# Patient Record
Sex: FEMALE | Race: WHITE | NOT HISPANIC OR LATINO | Employment: OTHER | URBAN - METROPOLITAN AREA
[De-identification: names, ages, dates, MRNs, and addresses within clinical notes are randomized per-mention and may not be internally consistent; named-entity substitution may affect disease eponyms.]

---

## 2017-03-08 ENCOUNTER — TELEPHONE (OUTPATIENT)
Dept: GASTROENTEROLOGY | Facility: CLINIC | Age: 47
End: 2017-03-08

## 2017-03-08 DIAGNOSIS — K86.3 CYST AND PSEUDOCYST OF PANCREAS: Primary | ICD-10-CM

## 2017-03-08 DIAGNOSIS — K86.2 CYST AND PSEUDOCYST OF PANCREAS: Primary | ICD-10-CM

## 2017-03-08 NOTE — TELEPHONE ENCOUNTER
Patient had EUS in 2015 with Dr Krueger for 4 pancreatic cysts.  I don't see where he made a recommended follow up.  Patient is calling because she wants a check up  Should she get another EUS or a CT/MRCP

## 2017-03-17 ENCOUNTER — TELEPHONE (OUTPATIENT)
Dept: ENDOSCOPY | Facility: HOSPITAL | Age: 47
End: 2017-03-17

## 2017-03-17 RX ORDER — ASPIRIN 81 MG/1
81 TABLET ORAL DAILY
COMMUNITY
End: 2022-02-10

## 2017-03-17 RX ORDER — INSULIN ASPART 100 [IU]/ML
30 INJECTION, SUSPENSION SUBCUTANEOUS 3 TIMES DAILY
COMMUNITY

## 2017-03-17 NOTE — TELEPHONE ENCOUNTER
Spoke with patient about Hx, allergies, meds and instructions for UEUS scheduled 3/27/17 at 1100.  Voices understanding.  Instructions mailed.

## 2017-03-27 ENCOUNTER — SURGERY (OUTPATIENT)
Age: 47
End: 2017-03-27

## 2017-03-27 ENCOUNTER — ANESTHESIA EVENT (OUTPATIENT)
Dept: ENDOSCOPY | Facility: HOSPITAL | Age: 47
End: 2017-03-27
Payer: MEDICAID

## 2017-03-27 ENCOUNTER — HOSPITAL ENCOUNTER (OUTPATIENT)
Facility: HOSPITAL | Age: 47
Discharge: HOME | End: 2017-03-27
Attending: INTERNAL MEDICINE | Admitting: INTERNAL MEDICINE
Payer: MEDICAID

## 2017-03-27 ENCOUNTER — ANESTHESIA (OUTPATIENT)
Dept: ENDOSCOPY | Facility: HOSPITAL | Age: 47
End: 2017-03-27
Payer: MEDICAID

## 2017-03-27 VITALS
TEMPERATURE: 98 F | HEIGHT: 60 IN | RESPIRATION RATE: 18 BRPM | DIASTOLIC BLOOD PRESSURE: 93 MMHG | HEART RATE: 89 BPM | BODY MASS INDEX: 41.23 KG/M2 | WEIGHT: 210 LBS | OXYGEN SATURATION: 99 % | SYSTOLIC BLOOD PRESSURE: 178 MMHG

## 2017-03-27 DIAGNOSIS — K86.2 PANCREAS CYST: Primary | ICD-10-CM

## 2017-03-27 LAB
POCT GLUCOSE: 121 MG/DL (ref 70–110)
POCT GLUCOSE: 146 MG/DL (ref 70–110)

## 2017-03-27 PROCEDURE — 25000003 PHARM REV CODE 250: Performed by: NURSE ANESTHETIST, CERTIFIED REGISTERED

## 2017-03-27 PROCEDURE — 81025 URINE PREGNANCY TEST: CPT | Performed by: INTERNAL MEDICINE

## 2017-03-27 PROCEDURE — 43259 EGD US EXAM DUODENUM/JEJUNUM: CPT | Mod: ,,, | Performed by: INTERNAL MEDICINE

## 2017-03-27 PROCEDURE — 63600175 PHARM REV CODE 636 W HCPCS: Performed by: NURSE ANESTHETIST, CERTIFIED REGISTERED

## 2017-03-27 PROCEDURE — 25000003 PHARM REV CODE 250: Performed by: INTERNAL MEDICINE

## 2017-03-27 PROCEDURE — 43259 EGD US EXAM DUODENUM/JEJUNUM: CPT | Performed by: INTERNAL MEDICINE

## 2017-03-27 PROCEDURE — 37000009 HC ANESTHESIA EA ADD 15 MINS: Performed by: INTERNAL MEDICINE

## 2017-03-27 PROCEDURE — D9220A PRA ANESTHESIA: Mod: CRNA,,, | Performed by: NURSE ANESTHETIST, CERTIFIED REGISTERED

## 2017-03-27 PROCEDURE — 37000008 HC ANESTHESIA 1ST 15 MINUTES: Performed by: INTERNAL MEDICINE

## 2017-03-27 PROCEDURE — D9220A PRA ANESTHESIA: Mod: ANES,,, | Performed by: ANESTHESIOLOGY

## 2017-03-27 RX ORDER — FUROSEMIDE 40 MG/1
40 TABLET ORAL 2 TIMES DAILY
COMMUNITY
End: 2022-01-11

## 2017-03-27 RX ORDER — MIDAZOLAM HYDROCHLORIDE 1 MG/ML
INJECTION, SOLUTION INTRAMUSCULAR; INTRAVENOUS
Status: DISCONTINUED | OUTPATIENT
Start: 2017-03-27 | End: 2017-03-27

## 2017-03-27 RX ORDER — PROPOFOL 10 MG/ML
VIAL (ML) INTRAVENOUS CONTINUOUS PRN
Status: DISCONTINUED | OUTPATIENT
Start: 2017-03-27 | End: 2017-03-27

## 2017-03-27 RX ORDER — SODIUM CHLORIDE 9 MG/ML
INJECTION, SOLUTION INTRAVENOUS CONTINUOUS PRN
Status: DISCONTINUED | OUTPATIENT
Start: 2017-03-27 | End: 2017-03-27

## 2017-03-27 RX ORDER — SODIUM CHLORIDE 9 MG/ML
INJECTION, SOLUTION INTRAVENOUS CONTINUOUS
Status: DISCONTINUED | OUTPATIENT
Start: 2017-03-27 | End: 2017-03-27 | Stop reason: HOSPADM

## 2017-03-27 RX ORDER — LABETALOL HYDROCHLORIDE 5 MG/ML
INJECTION, SOLUTION INTRAVENOUS
Status: DISCONTINUED | OUTPATIENT
Start: 2017-03-27 | End: 2017-03-27

## 2017-03-27 RX ORDER — METOPROLOL SUCCINATE 50 MG/1
50 TABLET, EXTENDED RELEASE ORAL DAILY
COMMUNITY
End: 2022-02-10

## 2017-03-27 RX ORDER — CLONIDINE HYDROCHLORIDE 0.1 MG/1
0.1 TABLET ORAL 2 TIMES DAILY
COMMUNITY
End: 2022-02-10

## 2017-03-27 RX ORDER — PROPOFOL 10 MG/ML
VIAL (ML) INTRAVENOUS
Status: DISCONTINUED | OUTPATIENT
Start: 2017-03-27 | End: 2017-03-27

## 2017-03-27 RX ORDER — GABAPENTIN 300 MG/1
300 CAPSULE ORAL 3 TIMES DAILY
COMMUNITY
End: 2022-02-10

## 2017-03-27 RX ORDER — LIDOCAINE HYDROCHLORIDE 10 MG/ML
INJECTION, SOLUTION INTRAVENOUS
Status: DISCONTINUED | OUTPATIENT
Start: 2017-03-27 | End: 2017-03-27

## 2017-03-27 RX ADMIN — SODIUM CHLORIDE: 0.9 INJECTION, SOLUTION INTRAVENOUS at 10:03

## 2017-03-27 RX ADMIN — PROPOFOL 50 MG: 10 INJECTION, EMULSION INTRAVENOUS at 10:03

## 2017-03-27 RX ADMIN — MIDAZOLAM HYDROCHLORIDE 2 MG: 1 INJECTION, SOLUTION INTRAMUSCULAR; INTRAVENOUS at 10:03

## 2017-03-27 RX ADMIN — LIDOCAINE HYDROCHLORIDE 100 MG: 10 INJECTION, SOLUTION INTRAVENOUS at 10:03

## 2017-03-27 RX ADMIN — PROPOFOL 150 MCG/KG/MIN: 10 INJECTION, EMULSION INTRAVENOUS at 10:03

## 2017-03-27 RX ADMIN — LABETALOL HYDROCHLORIDE 10 MG: 5 INJECTION, SOLUTION INTRAVENOUS at 10:03

## 2017-03-27 NOTE — IP AVS SNAPSHOT
[facility]  [address]  [facility] [address]  Phone: [phone]           Patient Discharge Instructions     Our goal is to set you up for success. This packet includes information on your condition, medications, and your home care. It will help you to care for yourself so you don't get sicker and need to go back to the hospital.     Please ask your nurse if you have any questions.        There are many details to remember when preparing to leave the hospital. Here is what you will need to do:    1. Take your medicine. If you are prescribed medications, review your Medication List in the following pages. You may have new medications to  at the pharmacy and others that you'll need to stop taking. Review the instructions for how and when to take your medications. Talk with your doctor or nurses if you are unsure of what to do.     2. Go to your follow-up appointments. Specific follow-up information is listed in the following pages. Your may be contacted by a transition nurse or clinical provider about future appointments. Be sure we have all of the phone numbers to reach you, if needed. Please contact your provider's office if you are unable to make an appointment.     3. Watch for warning signs. Your doctor or nurse will give you detailed warning signs to watch for and when to call for assistance. These instructions may also include educational information about your condition. If you experience any of warning signs to your health, call your doctor.               [facility]  Unless otherwise directed by your provider, please contact [facility], our nurse care line that is available for 24/7 assistance.     [phone] (toll-free)    Registered nurses in the [facility] provide clinical advisement, health education, appointment booking, and other advisory services.                    ** Verify the list of medication(s) below is accurate and up  to date. Carry this with you in case of emergency. If your medications have changed, please notify your healthcare provider.             Medication List      ASK your doctor about these medications        Additional Info                      acyclovir 800 MG Tab   Commonly known as:  ZOVIRAX   Refills:  0   Dose:  800 mg    Instructions:  Take 800 mg by mouth every 4 (four) hours.     Begin Date    AM    Noon    PM    Bedtime       aspirin 81 MG EC tablet   Commonly known as:  ECOTRIN   Refills:  0   Dose:  81 mg    Instructions:  Take 81 mg by mouth once daily.     Begin Date    AM    Noon    PM    Bedtime       BYDUREON 2 mg Ssrr   Refills:  0   Dose:  2 mg   Generic drug:  exenatide microspheres    Instructions:  Inject 2 mg into the skin once a week. On Wednesday.     Begin Date    AM    Noon    PM    Bedtime       cloNIDine 0.1 MG tablet   Commonly known as:  CATAPRES   Refills:  0   Dose:  0.1 mg    Instructions:  Take 0.1 mg by mouth 2 (two) times daily.     Begin Date    AM    Noon    PM    Bedtime       furosemide 40 MG tablet   Commonly known as:  LASIX   Refills:  0   Dose:  40 mg    Instructions:  Take 40 mg by mouth 2 (two) times daily.     Begin Date    AM    Noon    PM    Bedtime       gabapentin 300 MG capsule   Commonly known as:  NEURONTIN   Refills:  0   Dose:  300 mg    Instructions:  Take 300 mg by mouth 3 (three) times daily.     Begin Date    AM    Noon    PM    Bedtime       hydrocodone-acetaminophen 7.5-325mg 7.5-325 mg per tablet   Commonly known as:  NORCO   Refills:  0   Dose:  1 tablet    Instructions:  Take 1 tablet by mouth every 4 (four) hours as needed for Pain.     Begin Date    AM    Noon    PM    Bedtime       insulin aspart 100 unit/mL injection   Commonly known as:  NOVOLOG   Refills:  0    Instructions:  Inject into the skin 3 (three) times daily before meals.     Begin Date    AM    Noon    PM    Bedtime       insulin aspart protamine-insulin aspart 100 unit/mL (70-30) Inpn  pen   Commonly known as:  NovoLOG 70/30   Refills:  0   Dose:  30 Units    Instructions:  Inject 30 Units into the skin 3 (three) times daily.     Begin Date    AM    Noon    PM    Bedtime       lactulose 10 gram/15 mL solution   Commonly known as:  CHRONULAC   Refills:  0   Dose:  10 g    Instructions:  Take 10 g by mouth Daily.     Begin Date    AM    Noon    PM    Bedtime       methocarbamol 750 MG Tab   Commonly known as:  ROBAXIN   Refills:  0   Dose:  1500 mg    Instructions:  Take 1,500 mg by mouth 3 (three) times daily.     Begin Date    AM    Noon    PM    Bedtime       metoprolol succinate 50 MG 24 hr tablet   Commonly known as:  TOPROL-XL   Refills:  0   Dose:  50 mg    Instructions:  Take 50 mg by mouth once daily.     Begin Date    AM    Noon    PM    Bedtime       predniSONE 10 MG tablet   Commonly known as:  DELTASONE   Refills:  0   Dose:  10 mg    Instructions:  Take 10 mg by mouth once daily.     Begin Date    AM    Noon    PM    Bedtime       tramadol 50 mg tablet   Commonly known as:  ULTRAM   Refills:  0   Dose:  50 mg    Instructions:  Take 50 mg by mouth every 12 (twelve) hours as needed for Pain.     Begin Date    AM    Noon    PM    Bedtime       trazodone 50 MG tablet   Commonly known as:  DESYREL   Refills:  0   Dose:  50 mg    Instructions:  Take 50 mg by mouth every evening.     Begin Date    AM    Noon    PM    Bedtime                  Please bring to all follow up appointments:    1. A copy of your discharge instructions.  2. All medicines you are currently taking in their original bottles.  3. Identification and insurance card.    Please arrive 15 minutes ahead of scheduled appointment time.    Please call 24 hours in advance if you must reschedule your appointment and/or time.          Discharge Instructions     Future Orders    Diet general     Questions:    Total calories:      Fat restriction, if any:      Protein restriction, if any:      Na restriction, if any:      Fluid  restriction:      Additional restrictions:          Discharge Instructions         Endoscopic Ultrasound (EUS)    An endoscopic ultrasound (EUS) is a test to look at the inside of your gastrointestinal (GI) tract. It's commonly used to look for cancers or growths in the esophagus, stomach, pancreas, liver, and rectum. It can help to stage cancer (see how advanced a cancer is). EUS may also be used to help diagnose certain diseases or to drain cysts or abscesses.  What is EUS?  EUS shows both ultrasound images and live video of the GI tract. During the test, a flexible tube called an endoscope (scope) is used. At the end of the scope is a tiny video camera and light. The video camera sends live images to a monitor. The scope also contains a very small ultrasound device. This uses sound waves to create images and send them to a monitor.  A needle is passed through the scope. The needle can be used take a small sample of tissue for testing. This is called a biopsy. The needle can be used to take a sample of fluid. This is called fine-needle aspiration (FNA).  Risks and possible complications of EUS  Risks and possible complications include the following:  · Bleeding  · Infection  · A perforation (hole) in the digestive tract   · Risks of sedation or anesthesia   Before the test  Be prepared prior to the test:  · Tell your healthcare provider what medicine you take. This includes vitamins, herbs, and over-the-counter medicine. It also includes any blood thinners, such as warfarin, clopidogrel, ibuprofen, or daily aspirin. Ask your healthcare provider if you need to stop taking some or all of them before the test.  · You may be prescribed antibiotics to take before or after the test. This depends on the area being studied and what is done during the test. These medicines help prevent infection.  · Carefully follow the instructions for preparing for the test to make sure results are accurate. Instructions may  include:  ¨ If youre having an EUS of the upper GI tract (esophagus, stomach, duodenum, pancreas, liver):  § Do not eat or drink for 6 hours before the test.  ¨ If youre having an EUS of the lower GI tract (rectum):  § Before the test, do bowel prep as instructed to clean your rectum of stool. This may involve a clear liquid diet and using a laxative (liquid or pills) the night before the test. Or it may mean doing one or more enemas the morning of the test.  § Do not eat or drink for 6 hours before the test.  · Be sure to arrive on time at the facility. Bring your identification and health insurance card. Leave valuables at home. If you have them, bring X-rays or other test results with you.  Let the healthcare provider know  For your safety, tell the healthcare provider if you:  · Take insulin. Your dose may need to be changed on the day of your test.  · Are allergic to latex.  · Have any other allergies.  · Are taking blood thinners.   During the test  An endoscopic ultrasound usually takes place in a hospital. The procedure itself may take 1 to 2 hours. You will likely go home soon afterward. During the test:  · You lie on your left side on an exam table.  · An intravenous (IV) line will be put into a vein in your arm or hand. This line supplies fluids and medicines. To keep you comfortable during the test, you will be given a sedative medicine. This medicine prevents discomfort and will make you sleepy.  · If you are having an EUS of the upper GI tract, local anesthetic may be sprayed in your throat. This will help you be more comfortable as the healthcare provider inserts the scope. The healthcare provider then gently puts the flexible scope into your mouth or nose and down your throat.  · If youre having an EUS of the lower GI tract, the healthcare provider gently puts the flexible scope into your anus.  · During the test, the scope sends live video and ultrasound images from inside your body to nearby  monitors. These are used to examine your GI tract. Specialized procedures, such as drainage, are done as needed.  · The healthcare provider may discuss the results with you soon after the test. Biopsy results take several  days.  · In most cases, you can go home within a few hours of the test. When you leave the facility, have an adult family member or friend drive you, even if you don't feel that sleepy.  After the test  Here is what to expect after the test:  · You may feel tired from the sedative. This should wear off by the end of the day.  · If you had an upper digestive endoscopy, your throat may feel sore for a day or two. Over-the-counter sore throat lozenges and spray should help.  · You can eat and drink normally as soon as the test is done.  When to call the healthcare provider  Call your healthcare provider if you notice any of the following:  · Fever of 100.4°F (38.0°C) or higher, or as advised by your healthcare provider  · Shortness of breath  · Vomiting blood, blood in stool, or black stools  · Coughing or hoarse voice that wont go away   Date Last Reviewed: 7/1/2016  © 8060-4809 [employer]. [address] [place] [address]. All rights reserved. This information is not intended as a substitute for professional medical care. Always follow your healthcare professional's instructions.            Admission Information     Date & Time Provider Department CSN    3/27/2017  9:38 AM Avinash Valencia MD [facility] [id number]      Care Providers     Provider Role Specialty Primary office phone    Avinash Valencia MD Attending Provider Gastroenterology [phone]    Avinash Valencia MD Surgeon  Gastroenterology [phone]      Your Vitals Were     BP Pulse Temp Resp Height Weight    175/96 (BP Location: Left arm, Patient Position: Lying, BP Method: Automatic) 95 98.5 °F (36.9 °C) (Temporal) 18 5' (1.524 m) 95.3 kg (210 lb)    Last Period SpO2 BMI           02/22/2017 (Approximate) 99% 41.01 kg/m2        Recent Lab Values     No lab values to display.      Allergies as of 3/27/2017     No Known Allergies      Advance Directives     An advance directive is a document which, in the event you are no longer able to make decisions for yourself, tells your healthcare team what kind of treatment you do or do not want to receive, or who you would like to make those decisions for you.  If you do not currently have an advance directive, [facility] encourages you to create one.  For more information call:  [phone]), [phone] ([phone]),  or log on to [email or web address].        Language Assistance Services     ATTENTION: Language assistance services are available, free of charge. Please call [phone].      ATENCIÓN: Si habla español, tiene a fernandez disposición servicios gratuitos de asistencia lingüística. Llame al [phone].     CHÚ Ý: N?u b?n nói Ti?ng Vi?t, có các d?ch v? h? tr? ngôn ng? mi?n phí dành cho b?n. G?i s? [phone].        [facility] Sign-Up     Activating your [facility] account is as easy as 1-2-3!     1) Visit [email or web address], select Sign Up Now, enter this activation code and your date of birth, then select Next.  [email or web address]  Expires: 5/11/2017 11:50 AM      2) Create a username and password to use when you visit [facility] in the future and select a security question in case you lose your password and select Next.    3) Enter your e-mail address and click Sign Up!    Additional Information  If you have questions, please e-mail [email or web address] or call [phone] to talk to our [facility] staff. Remember, [facility] is NOT to be used for urgent needs. For medical emergencies, dial 911.          [facility] complies with applicable Federal civil rights laws and does not discriminate on the basis of race, color, national origin, age, disability, or sex.

## 2017-03-27 NOTE — ANESTHESIA POSTPROCEDURE EVALUATION
Anesthesia Post Evaluation    Patient: Mary Carmichael    Procedure(s) Performed: Procedure(s) (LRB):  ULTRASOUND-ENDOSCOPIC-UPPER (N/A)    Final Anesthesia Type: general  Patient location during evaluation: PACU  Patient participation: Yes- Able to Participate  Level of consciousness: awake and alert and oriented  Post-procedure vital signs: reviewed and stable  Pain management: adequate  Airway patency: patent  PONV status at discharge: No PONV  Anesthetic complications: no      Cardiovascular status: blood pressure returned to baseline and hemodynamically stable  Respiratory status: unassisted and spontaneous ventilation  Hydration status: euvolemic  Follow-up not needed.        Visit Vitals    BP (!) 175/96 (BP Location: Left arm, Patient Position: Lying, BP Method: Automatic)    Pulse 95    Temp 36.9 °C (98.5 °F) (Temporal)    Resp 18    Ht 5' (1.524 m)    Wt 95.3 kg (210 lb)    LMP 02/22/2017 (Approximate)    SpO2 99%    Breastfeeding No    BMI 41.01 kg/m2       Pain/Tao Score: Pain Assessment Performed: Yes (3/27/2017 10:07 AM)  Presence of Pain: denies (3/27/2017 11:38 AM)  Tao Score: 10 (3/27/2017 11:38 AM)

## 2017-03-27 NOTE — ANESTHESIA RELEASE NOTE
Anesthesia Release from PACU Note    Patient: Mary Carmichael    Procedure(s) Performed: Procedure(s) (LRB):  ULTRASOUND-ENDOSCOPIC-UPPER (N/A)    Anesthesia type: general    Post pain: Adequate analgesia    Post assessment: no apparent anesthetic complications and tolerated procedure well    Last Vitals:   Visit Vitals    BP (!) 175/96 (BP Location: Left arm, Patient Position: Lying, BP Method: Automatic)    Pulse 95    Temp 36.9 °C (98.5 °F) (Temporal)    Resp 18    Ht 5' (1.524 m)    Wt 95.3 kg (210 lb)    LMP 02/22/2017 (Approximate)    SpO2 99%    Breastfeeding No    BMI 41.01 kg/m2       Post vital signs: stable    Level of consciousness: awake and alert     Nausea/Vomiting: no nausea/no vomiting    Complications: none    Airway Patency: patent    Respiratory: unassisted, spontaneous ventilation    Cardiovascular: stable and blood pressure at baseline    Hydration: euvolemic

## 2017-03-27 NOTE — DISCHARGE INSTRUCTIONS
Endoscopic Ultrasound (EUS)    An endoscopic ultrasound (EUS) is a test to look at the inside of your gastrointestinal (GI) tract. It's commonly used to look for cancers or growths in the esophagus, stomach, pancreas, liver, and rectum. It can help to stage cancer (see how advanced a cancer is). EUS may also be used to help diagnose certain diseases or to drain cysts or abscesses.  What is EUS?  EUS shows both ultrasound images and live video of the GI tract. During the test, a flexible tube called an endoscope (scope) is used. At the end of the scope is a tiny video camera and light. The video camera sends live images to a monitor. The scope also contains a very small ultrasound device. This uses sound waves to create images and send them to a monitor.  A needle is passed through the scope. The needle can be used take a small sample of tissue for testing. This is called a biopsy. The needle can be used to take a sample of fluid. This is called fine-needle aspiration (FNA).  Risks and possible complications of EUS  Risks and possible complications include the following:  · Bleeding  · Infection  · A perforation (hole) in the digestive tract   · Risks of sedation or anesthesia   Before the test  Be prepared prior to the test:  · Tell your healthcare provider what medicine you take. This includes vitamins, herbs, and over-the-counter medicine. It also includes any blood thinners, such as warfarin, clopidogrel, ibuprofen, or daily aspirin. Ask your healthcare provider if you need to stop taking some or all of them before the test.  · You may be prescribed antibiotics to take before or after the test. This depends on the area being studied and what is done during the test. These medicines help prevent infection.  · Carefully follow the instructions for preparing for the test to make sure results are accurate. Instructions may include:  ¨ If youre having an EUS of the upper GI tract (esophagus, stomach, duodenum,  pancreas, liver):  § Do not eat or drink for 6 hours before the test.  ¨ If youre having an EUS of the lower GI tract (rectum):  § Before the test, do bowel prep as instructed to clean your rectum of stool. This may involve a clear liquid diet and using a laxative (liquid or pills) the night before the test. Or it may mean doing one or more enemas the morning of the test.  § Do not eat or drink for 6 hours before the test.  · Be sure to arrive on time at the facility. Bring your identification and health insurance card. Leave valuables at home. If you have them, bring X-rays or other test results with you.  Let the healthcare provider know  For your safety, tell the healthcare provider if you:  · Take insulin. Your dose may need to be changed on the day of your test.  · Are allergic to latex.  · Have any other allergies.  · Are taking blood thinners.   During the test  An endoscopic ultrasound usually takes place in a hospital. The procedure itself may take 1 to 2 hours. You will likely go home soon afterward. During the test:  · You lie on your left side on an exam table.  · An intravenous (IV) line will be put into a vein in your arm or hand. This line supplies fluids and medicines. To keep you comfortable during the test, you will be given a sedative medicine. This medicine prevents discomfort and will make you sleepy.  · If you are having an EUS of the upper GI tract, local anesthetic may be sprayed in your throat. This will help you be more comfortable as the healthcare provider inserts the scope. The healthcare provider then gently puts the flexible scope into your mouth or nose and down your throat.  · If youre having an EUS of the lower GI tract, the healthcare provider gently puts the flexible scope into your anus.  · During the test, the scope sends live video and ultrasound images from inside your body to nearby monitors. These are used to examine your GI tract. Specialized procedures, such as drainage,  are done as needed.  · The healthcare provider may discuss the results with you soon after the test. Biopsy results take several  days.  · In most cases, you can go home within a few hours of the test. When you leave the facility, have an adult family member or friend drive you, even if you don't feel that sleepy.  After the test  Here is what to expect after the test:  · You may feel tired from the sedative. This should wear off by the end of the day.  · If you had an upper digestive endoscopy, your throat may feel sore for a day or two. Over-the-counter sore throat lozenges and spray should help.  · You can eat and drink normally as soon as the test is done.  When to call the healthcare provider  Call your healthcare provider if you notice any of the following:  · Fever of 100.4°F (38.0°C) or higher, or as advised by your healthcare provider  · Shortness of breath  · Vomiting blood, blood in stool, or black stools  · Coughing or hoarse voice that wont go away   Date Last Reviewed: 7/1/2016  © 2344-2492 [employer]. [address] [place] [address]. All rights reserved. This information is not intended as a substitute for professional medical care. Always follow your healthcare professional's instructions.

## 2017-03-27 NOTE — H&P
History & Physical - Short Stay  Gastroenterology      SUBJECTIVE:     Procedure: EUS    Chief Complaint/Indication for Procedure: pancreas cyst    History of Present Illness:  Patient is a 46 y.o. female with pancreas cysts seen on EUS in 2015. Coming for surveillance.     PTA Medications   Medication Sig    aspirin (ECOTRIN) 81 MG EC tablet Take 81 mg by mouth once daily.    cloNIDine (CATAPRES) 0.1 MG tablet Take 0.1 mg by mouth 2 (two) times daily.    exenatide microspheres (BYDUREON) 2 mg SSRR Inject 2 mg into the skin once a week. On Wednesday.    furosemide (LASIX) 40 MG tablet Take 40 mg by mouth 2 (two) times daily.    gabapentin (NEURONTIN) 300 MG capsule Take 300 mg by mouth 3 (three) times daily.    insulin aspart (NOVOLOG) 100 unit/mL injection Inject into the skin 3 (three) times daily before meals.    insulin aspart protamine-insulin aspart (NOVOLOG 70/30) 100 unit/mL (70-30) InPn pen Inject 30 Units into the skin 3 (three) times daily.    methocarbamol (ROBAXIN) 750 MG Tab Take 1,500 mg by mouth 3 (three) times daily.    metoprolol succinate (TOPROL-XL) 50 MG 24 hr tablet Take 50 mg by mouth once daily.    predniSONE (DELTASONE) 10 MG tablet Take 10 mg by mouth once daily.    tramadol (ULTRAM) 50 mg tablet Take 50 mg by mouth every 12 (twelve) hours as needed for Pain.    trazodone (DESYREL) 50 MG tablet Take 50 mg by mouth every evening.    acyclovir (ZOVIRAX) 800 MG Tab Take 800 mg by mouth every 4 (four) hours.    hydrocodone-acetaminophen 7.5-325mg (NORCO) 7.5-325 mg per tablet Take 1 tablet by mouth every 4 (four) hours as needed for Pain.    lactulose (CHRONULAC) 10 gram/15 mL solution Take 10 g by mouth Daily.       Review of patient's allergies indicates:  No Known Allergies     Past Medical History:   Diagnosis Date    Abdominal pain     Cataract of both eyes     Constipation     Coronary artery disease     Cyst and pseudocyst of pancreas     Diabetes     Dilated bile  duct     Hepatitis C     Neuropathy     Thyroid disease      Past Surgical History:   Procedure Laterality Date    CARPAL TUNNEL RELEASE       SECTION, CLASSIC      CHOLECYSTECTOMY  2015    laparoscopic    EYE SURGERY       Family History   Problem Relation Age of Onset    Colon cancer Other      uncle    Diabetes Father      Social History   Substance Use Topics    Smoking status: Never Smoker    Smokeless tobacco: None    Alcohol use None       Review of Systems:  Constitutional: no fever or chills  Gastrointestinal: no nausea or vomiting, no abdominal pain or change in bowel habits    OBJECTIVE:     Vital Signs (Most Recent)  Temp: 98.7 °F (37.1 °C) (17 1004)  Pulse: 89 (17 1004)  Resp: 18 (17 1004)  BP: (!) 191/87 (17 1004)  SpO2: 100 % (17 1004)    Physical Exam:  General: well developed, well nourished  Abdomen: soft, non-tender non-distented; bowel sounds normal; no masses,  no organomegaly        ASSESSMENT/PLAN:     Patient is a 46 y.o. female with pancreas cysts seen on EUS in . Coming for surveillance.     Plan: EUS    Anesthesia Plan: MAC    ASA Grade: ASA 2 - Patient with mild systemic disease with no functional limitations

## 2017-03-27 NOTE — PLAN OF CARE
Problem: Patient Care Overview  Goal: Plan of Care Review  Outcome: Outcome(s) achieved Date Met:  03/27/17  Patient tolerating oral liquids without difficulty. No apparent s&s of distress noted at this time, no complaints voiced at this time. Discharge instructions reviewed with patient/family/friend with good verbal feedback received. Patient ready for discharge

## 2017-03-27 NOTE — TRANSFER OF CARE
Anesthesia Transfer of Care Note    Patient: Mary Carmichael    Procedure(s) Performed: Procedure(s) (LRB):  ULTRASOUND-ENDOSCOPIC-UPPER (N/A)    Patient location: PACU    Anesthesia Type: general    Transport from OR: Transported from OR on room air with adequate spontaneous ventilation. Transported from OR on 6-10 L/min O2 by face mask with adequate spontaneous ventilation    Post pain: adequate analgesia    Post assessment: no apparent anesthetic complications and tolerated procedure well    Post vital signs: stable    Level of consciousness: awake and alert    Nausea/Vomiting: no nausea/vomiting    Complications: none          Last vitals:   Visit Vitals    BP (!) 143/70 (BP Location: Left arm, Patient Position: Lying, BP Method: Automatic)    Pulse 96    Temp 36.9 °C (98.5 °F) (Temporal)    Resp 18    Ht 5' (1.524 m)    Wt 95.3 kg (210 lb)    LMP 02/22/2017 (Approximate)    SpO2 99%    Breastfeeding No    BMI 41.01 kg/m2

## 2019-01-28 ENCOUNTER — TELEPHONE (OUTPATIENT)
Dept: ENDOSCOPY | Facility: HOSPITAL | Age: 49
End: 2019-01-28

## 2019-01-28 DIAGNOSIS — K86.2 PANCREATIC CYST: ICD-10-CM

## 2021-11-10 ENCOUNTER — TELEPHONE (OUTPATIENT)
Dept: OBSTETRICS AND GYNECOLOGY | Facility: CLINIC | Age: 51
End: 2021-11-10
Payer: MEDICARE

## 2021-12-07 ENCOUNTER — OFFICE VISIT (OUTPATIENT)
Dept: OBSTETRICS AND GYNECOLOGY | Facility: CLINIC | Age: 51
End: 2021-12-07
Payer: MEDICARE

## 2021-12-07 VITALS
DIASTOLIC BLOOD PRESSURE: 78 MMHG | BODY MASS INDEX: 36.32 KG/M2 | SYSTOLIC BLOOD PRESSURE: 140 MMHG | WEIGHT: 185 LBS | HEIGHT: 60 IN

## 2021-12-07 DIAGNOSIS — N95.0 POSTMENOPAUSAL BLEEDING: ICD-10-CM

## 2021-12-07 DIAGNOSIS — Z12.31 ENCOUNTER FOR SCREENING MAMMOGRAM FOR BREAST CANCER: ICD-10-CM

## 2021-12-07 DIAGNOSIS — Z01.419 ENCOUNTER FOR WELL WOMAN EXAM WITH ROUTINE GYNECOLOGICAL EXAM: Primary | ICD-10-CM

## 2021-12-07 PROBLEM — R00.1 BRADYCARDIA, UNSPECIFIED: Status: ACTIVE | Noted: 2019-10-15

## 2021-12-07 PROBLEM — E03.9 HYPOTHYROIDISM: Status: ACTIVE | Noted: 2021-12-07

## 2021-12-07 PROBLEM — E55.9 VITAMIN D DEFICIENCY: Status: ACTIVE | Noted: 2021-12-07

## 2021-12-07 PROBLEM — M54.16 RADICULOPATHY, LUMBAR REGION: Status: ACTIVE | Noted: 2019-10-15

## 2021-12-07 PROBLEM — E11.42 DIABETIC PERIPHERAL NEUROPATHY: Status: ACTIVE | Noted: 2021-12-07

## 2021-12-07 PROBLEM — K22.10 EROSIVE ESOPHAGITIS: Status: ACTIVE | Noted: 2021-12-07

## 2021-12-07 PROBLEM — Z98.84 STATUS POST BARIATRIC SURGERY: Status: ACTIVE | Noted: 2021-10-15

## 2021-12-07 PROBLEM — D63.1 ANEMIA IN CHRONIC KIDNEY DISEASE: Status: ACTIVE | Noted: 2019-06-16

## 2021-12-07 PROBLEM — I27.21 PULMONARY ARTERIAL HYPERTENSION: Status: ACTIVE | Noted: 2021-12-07

## 2021-12-07 PROBLEM — R79.9 ABNORMAL BLOOD CHEMISTRY: Status: ACTIVE | Noted: 2018-07-05

## 2021-12-07 PROBLEM — Z90.3 HISTORY OF SLEEVE GASTRECTOMY: Status: ACTIVE | Noted: 2021-12-07

## 2021-12-07 PROBLEM — E87.8 OTHER DISORDERS OF ELECTROLYTE AND FLUID BALANCE, NOT ELSEWHERE CLASSIFIED: Status: ACTIVE | Noted: 2020-03-13

## 2021-12-07 PROBLEM — I10 BENIGN ESSENTIAL HYPERTENSION: Status: ACTIVE | Noted: 2018-07-05

## 2021-12-07 PROBLEM — N18.6 END-STAGE RENAL DISEASE: Status: ACTIVE | Noted: 2021-12-07

## 2021-12-07 PROBLEM — E11.9 TYPE 2 DIABETES MELLITUS: Status: ACTIVE | Noted: 2019-06-16

## 2021-12-07 PROBLEM — B18.2 CHRONIC HEPATITIS C VIRUS INFECTION: Status: ACTIVE | Noted: 2021-12-07

## 2021-12-07 PROBLEM — D50.9 IRON DEFICIENCY ANEMIA, UNSPECIFIED: Status: ACTIVE | Noted: 2020-03-13

## 2021-12-07 PROBLEM — E87.5 HYPERKALEMIA: Status: ACTIVE | Noted: 2020-11-19

## 2021-12-07 PROBLEM — G47.33 OBSTRUCTIVE SLEEP APNEA SYNDROME: Status: ACTIVE | Noted: 2021-12-07

## 2021-12-07 PROBLEM — M89.9 DISORDER OF BONE, UNSPECIFIED: Status: ACTIVE | Noted: 2019-06-16

## 2021-12-07 PROBLEM — R06.02 SHORTNESS OF BREATH: Status: ACTIVE | Noted: 2021-12-07

## 2021-12-07 PROBLEM — N25.81 SECONDARY HYPERPARATHYROIDISM OF RENAL ORIGIN: Status: ACTIVE | Noted: 2019-06-16

## 2021-12-07 PROBLEM — R60.0 EDEMA OF BOTH LOWER EXTREMITIES: Status: ACTIVE | Noted: 2021-12-07

## 2021-12-07 PROBLEM — E78.2 MIXED HYPERLIPIDEMIA: Status: ACTIVE | Noted: 2018-07-05

## 2021-12-07 PROBLEM — R09.89 CAROTID BRUIT: Status: ACTIVE | Noted: 2021-12-07

## 2021-12-07 PROBLEM — E66.9 OBESITY: Status: ACTIVE | Noted: 2021-12-07

## 2021-12-07 PROBLEM — I27.20 SEVERE PULMONARY HYPERTENSION: Status: ACTIVE | Noted: 2021-12-07

## 2021-12-07 PROBLEM — G47.10 HYPERSOMNIA: Status: ACTIVE | Noted: 2021-12-07

## 2021-12-07 PROBLEM — R09.02 HYPOXEMIA: Status: ACTIVE | Noted: 2021-12-07

## 2021-12-07 PROBLEM — N18.9 ANEMIA IN CHRONIC KIDNEY DISEASE: Status: ACTIVE | Noted: 2019-06-16

## 2021-12-07 PROBLEM — D68.9 COAGULATION DEFECT, UNSPECIFIED: Status: ACTIVE | Noted: 2019-05-22

## 2021-12-07 PROBLEM — Z79.899 LONG TERM USE OF DRUG: Status: ACTIVE | Noted: 2018-07-05

## 2021-12-07 PROBLEM — K21.9 GASTROESOPHAGEAL REFLUX DISEASE: Status: ACTIVE | Noted: 2021-12-07

## 2021-12-07 PROBLEM — N18.6 ESRD ON PERITONEAL DIALYSIS: Status: ACTIVE | Noted: 2018-08-03

## 2021-12-07 PROBLEM — E63.9 NUTRITION IMPAIRED DUE TO IMBALANCE OF NUTRIENTS: Status: ACTIVE | Noted: 2018-07-05

## 2021-12-07 PROBLEM — Z99.2 ESRD ON PERITONEAL DIALYSIS: Status: ACTIVE | Noted: 2018-08-03

## 2021-12-07 PROBLEM — D53.9 MACROCYTIC ANEMIA: Status: ACTIVE | Noted: 2021-12-07

## 2021-12-07 PROBLEM — Z99.2 DEPENDENCE ON RENAL DIALYSIS: Status: ACTIVE | Noted: 2020-09-29

## 2021-12-07 PROBLEM — M51.36 DEGENERATION OF INTERVERTEBRAL DISC OF LUMBAR REGION: Status: ACTIVE | Noted: 2021-12-07

## 2021-12-07 PROCEDURE — 88141 PR  CYTOPATH CERV/VAG INTERPRET: ICD-10-PCS | Mod: ,,, | Performed by: PATHOLOGY

## 2021-12-07 PROCEDURE — 88175 CYTOPATH C/V AUTO FLUID REDO: CPT | Performed by: PATHOLOGY

## 2021-12-07 PROCEDURE — 88141 CYTOPATH C/V INTERPRET: CPT | Mod: ,,, | Performed by: PATHOLOGY

## 2021-12-07 PROCEDURE — G0101 PR CA SCREEN;PELVIC/BREAST EXAM: ICD-10-PCS | Mod: S$GLB,,, | Performed by: NURSE PRACTITIONER

## 2021-12-07 PROCEDURE — G0101 CA SCREEN;PELVIC/BREAST EXAM: HCPCS | Mod: S$GLB,,, | Performed by: NURSE PRACTITIONER

## 2021-12-07 RX ORDER — METRONIDAZOLE 7.5 MG/G
1 GEL VAGINAL DAILY
Qty: 70 G | Refills: 5 | Status: SHIPPED | OUTPATIENT
Start: 2021-12-07 | End: 2021-12-12

## 2021-12-16 LAB
FINAL PATHOLOGIC DIAGNOSIS: ABNORMAL
Lab: ABNORMAL

## 2021-12-17 ENCOUNTER — TELEPHONE (OUTPATIENT)
Dept: OBSTETRICS AND GYNECOLOGY | Facility: CLINIC | Age: 51
End: 2021-12-17
Payer: MEDICARE

## 2021-12-20 ENCOUNTER — TELEPHONE (OUTPATIENT)
Dept: OBSTETRICS AND GYNECOLOGY | Facility: CLINIC | Age: 51
End: 2021-12-20
Payer: MEDICARE

## 2021-12-28 ENCOUNTER — PROCEDURE VISIT (OUTPATIENT)
Dept: OBSTETRICS AND GYNECOLOGY | Facility: CLINIC | Age: 51
End: 2021-12-28
Payer: MEDICARE

## 2021-12-28 VITALS
HEIGHT: 60 IN | DIASTOLIC BLOOD PRESSURE: 65 MMHG | SYSTOLIC BLOOD PRESSURE: 134 MMHG | WEIGHT: 183.63 LBS | BODY MASS INDEX: 36.05 KG/M2

## 2021-12-28 DIAGNOSIS — R87.613 HIGH GRADE SQUAMOUS INTRAEPITHELIAL LESION (HGSIL) ON CYTOLOGIC SMEAR OF CERVIX: Primary | ICD-10-CM

## 2021-12-28 PROCEDURE — 99203 OFFICE O/P NEW LOW 30 MIN: CPT | Mod: S$GLB,,, | Performed by: OBSTETRICS & GYNECOLOGY

## 2021-12-28 PROCEDURE — 99203 PR OFFICE/OUTPT VISIT, NEW, LEVL III, 30-44 MIN: ICD-10-PCS | Mod: S$GLB,,, | Performed by: OBSTETRICS & GYNECOLOGY

## 2022-01-11 ENCOUNTER — HOSPITAL ENCOUNTER (OUTPATIENT)
Dept: RADIOLOGY | Facility: CLINIC | Age: 52
Discharge: HOME | End: 2022-01-11
Attending: NURSE PRACTITIONER
Payer: MEDICARE

## 2022-01-11 ENCOUNTER — OFFICE VISIT (OUTPATIENT)
Dept: OBSTETRICS AND GYNECOLOGY | Facility: CLINIC | Age: 52
End: 2022-01-11
Payer: MEDICARE

## 2022-01-11 VITALS
SYSTOLIC BLOOD PRESSURE: 102 MMHG | WEIGHT: 185 LBS | DIASTOLIC BLOOD PRESSURE: 60 MMHG | HEIGHT: 60 IN | BODY MASS INDEX: 36.32 KG/M2

## 2022-01-11 DIAGNOSIS — Z12.31 ENCOUNTER FOR SCREENING MAMMOGRAM FOR BREAST CANCER: ICD-10-CM

## 2022-01-11 DIAGNOSIS — R87.613 HIGH GRADE SQUAMOUS INTRAEPITHELIAL LESION (HGSIL) ON CYTOLOGIC SMEAR OF CERVIX: Primary | ICD-10-CM

## 2022-01-11 PROCEDURE — 99214 OFFICE O/P EST MOD 30 MIN: CPT | Mod: S$GLB,,, | Performed by: OBSTETRICS & GYNECOLOGY

## 2022-01-11 PROCEDURE — 77067 MAMMO DIGITAL SCREENING BILAT WITH TOMO: ICD-10-PCS | Mod: S$GLB,,, | Performed by: RADIOLOGY

## 2022-01-11 PROCEDURE — 77067 SCR MAMMO BI INCL CAD: CPT | Mod: S$GLB,,, | Performed by: RADIOLOGY

## 2022-01-11 PROCEDURE — 77063 BREAST TOMOSYNTHESIS BI: CPT | Mod: S$GLB,,, | Performed by: RADIOLOGY

## 2022-01-11 PROCEDURE — 99214 PR OFFICE/OUTPT VISIT, EST, LEVL IV, 30-39 MIN: ICD-10-PCS | Mod: S$GLB,,, | Performed by: OBSTETRICS & GYNECOLOGY

## 2022-01-11 PROCEDURE — 77063 MAMMO DIGITAL SCREENING BILAT WITH TOMO: ICD-10-PCS | Mod: S$GLB,,, | Performed by: RADIOLOGY

## 2022-01-11 RX ORDER — FUROSEMIDE 80 MG/1
1 TABLET ORAL
COMMUNITY
Start: 2021-12-12

## 2022-01-11 RX ORDER — SILDENAFIL CITRATE 20 MG/1
20 TABLET ORAL DAILY
COMMUNITY
Start: 2022-01-09 | End: 2022-02-10 | Stop reason: DRUGHIGH

## 2022-01-11 RX ORDER — SPIRONOLACTONE 50 MG/1
100 TABLET, FILM COATED ORAL
COMMUNITY
Start: 2021-12-21 | End: 2022-02-10

## 2022-01-11 RX ORDER — MIDODRINE HYDROCHLORIDE 5 MG/1
1 TABLET ORAL
COMMUNITY
Start: 2021-04-26

## 2022-01-11 RX ORDER — LEVOTHYROXINE SODIUM 50 UG/1
50 TABLET ORAL DAILY
COMMUNITY
Start: 2022-01-08

## 2022-01-11 RX ORDER — HYDROXYZINE HYDROCHLORIDE 25 MG/1
1 TABLET, FILM COATED ORAL
COMMUNITY
Start: 2021-08-01 | End: 2022-02-10 | Stop reason: ALTCHOICE

## 2022-01-11 NOTE — PROGRESS NOTES
Mary Carmichael is a 51 y.o.  who presents today for pre-op visit the patient has a high-grade abnormal Pap smear with possible involvement of the endocervix and she has had a prior cryotherapy in the past for an abnormal Pap smear so she needs a LEEP cone biopsy with D and C at this time.  She understands risk, alternatives, and indications for that procedure.    C/C:   Chief Complaint   Patient presents with    Pre-op Exam     Pt here for pre-op. Leep BX with D&C scheduled 2022.       HPI: [place] [facility]  The procedure to be performed is LEEP BX with D&C.     MENSTRUAL HISTORY  Patient's last menstrual period was 2017 (approximate)..      PAP HISTORY: last pap , has hx of abnormal pap.        Review of patient's allergies indicates:  No Known Allergies  Past Medical History:   Diagnosis Date    Abdominal pain     Abnormal Pap smear of cervix 2016    colpo, cryo    Cataract of both eyes     Constipation     Coronary artery disease     Cyst and pseudocyst of pancreas     Diabetes     Dilated bile duct     Hepatitis C     Neuropathy     Thyroid disease      Past Surgical History:   Procedure Laterality Date    CARPAL TUNNEL RELEASE       SECTION, CLASSIC      CHOLECYSTECTOMY  2015    laparoscopic    Dylasis      EYE SURGERY      FINGER SURGERY      GASTRIC BYPASS  2021     Past Surgical History:   Procedure Laterality Date    CARPAL TUNNEL RELEASE       SECTION, CLASSIC      CHOLECYSTECTOMY  2015    laparoscopic    Dylasis      EYE SURGERY      FINGER SURGERY      GASTRIC BYPASS  2021     OB History    Para Term  AB Living   1 1 1 0 0 1   SAB IAB Ectopic Multiple Live Births   0 0 0 0 1      # Outcome Date GA Lbr Lenin/2nd Weight Sex Delivery Anes PTL Lv   1 Term 00    F  OTHER  SANTA     OB History        1    Para   1    Term   1       0    AB   0    Living    1       SAB   0    IAB   0    Ectopic   0    Multiple   0    Live Births   1               Family History   Problem Relation Age of Onset    Colon cancer Other         uncle    Diabetes Father     Asthma Mother     Diabetes Maternal Grandmother     Diabetes Maternal Grandfather     Diabetes Paternal Grandmother     Diabetes Paternal Grandfather      Social History     Substance and Sexual Activity   Sexual Activity Never    Partners: Male    Birth control/protection: None       Primary Doctor No          ROS  Review of Systems    OBJECTIVE:  /60   Ht 5' (1.524 m)   Wt 83.9 kg (185 lb)   LMP 2017 (Approximate)   BMI 36.13 kg/m²   Physical Exam   Constitutional/Gen: Constitutional/Gen: NAD, appears stated age  Breast: deferred  External genitalia: no lesions or discharge, normal hair distribution  Vagina: normal appearance, no lesions, no discharge, no evidence cystocele or rectocele.  Cervix: normal appearance, no discharge, no lesions, negative CMT  Uterus: nontender, mobile, normal size, contour, and position. No pathologic descent.  Adnexa: no masses or tenderness  Neurologic: A&O x 4  Psych: affect appropriate and without signs of mood, thought or memory difficulty appreciated    A:  High-grade cervix dysplasia on Pap smear and prior procedure to try to treat a lower grade abnormal Pap smear years ago.  51 y.o. female  for pre-op visit  Body mass index is 36.13 kg/m².  Patient Active Problem List   Diagnosis    Pancreas cyst    Abnormal blood chemistry    Anemia in chronic kidney disease    Benign essential hypertension    Bradycardia, unspecified    Carotid bruit    Chronic hepatitis C virus infection    Coagulation defect, unspecified    Degeneration of intervertebral disc of lumbar region    Dependence on renal dialysis    Type 2 diabetes mellitus    Diabetic peripheral neuropathy    Disorder of bone, unspecified    Edema of both lower extremities    End-stage  renal disease    Erosive esophagitis    ESRD on peritoneal dialysis    Gastroesophageal reflux disease    History of sleeve gastrectomy    Mixed hyperlipidemia    Hyperkalemia    Hypersomnia    Hypothyroidism    Hypoxemia    Iron deficiency anemia, unspecified    Long term use of drug    Radiculopathy, lumbar region    Macrocytic anemia    Other disorders of electrolyte and fluid balance, not elsewhere classified    Obesity    Nutrition impaired due to imbalance of nutrients    Obstructive sleep apnea syndrome    Pulmonary arterial hypertension    Secondary hyperparathyroidism of renal origin    Severe pulmonary hypertension    Shortness of breath    Status post bariatric surgery    Vitamin D deficiency    High grade squamous intraepithelial lesion (HGSIL) on cytologic smear of cervix         P:  Scheduled for LEEP cone biopsy and D&C.  There are no diagnoses linked to this encounter.      No follow-ups on file.

## 2022-01-21 ENCOUNTER — TELEPHONE (OUTPATIENT)
Dept: OBSTETRICS AND GYNECOLOGY | Facility: CLINIC | Age: 52
End: 2022-01-21
Payer: MEDICARE

## 2022-01-21 NOTE — TELEPHONE ENCOUNTER
SRG notified the office that the Pt did not show for her pre op visit. Spoke to Pt and she stated she was diagnosed with Covid on 1/18/22. Per Dr. Rendon Pt needs to wait two weeks before proceeding with surgery. Pt notified and verbalized understanding. She will call back when she wants to get it rescheduled.

## 2022-01-31 NOTE — TELEPHONE ENCOUNTER
Pt called stating she is ready to proceed with scheduling surgery. Offered 3/3/22 but Pt states she was told her surgery does not need to wait. Informed Pt I will notify the provider to see if he will make an exception for her surgery or if it can wait until March due to February surgery dates not being available.

## 2022-02-10 ENCOUNTER — OFFICE VISIT (OUTPATIENT)
Dept: OBSTETRICS AND GYNECOLOGY | Facility: CLINIC | Age: 52
End: 2022-02-10
Payer: MEDICARE

## 2022-02-10 VITALS
BODY MASS INDEX: 36.08 KG/M2 | SYSTOLIC BLOOD PRESSURE: 124 MMHG | WEIGHT: 183.81 LBS | HEIGHT: 60 IN | DIASTOLIC BLOOD PRESSURE: 76 MMHG

## 2022-02-10 DIAGNOSIS — R87.613 HIGH GRADE SQUAMOUS INTRAEPITHELIAL LESION (HGSIL) ON CYTOLOGIC SMEAR OF CERVIX: Primary | ICD-10-CM

## 2022-02-10 PROCEDURE — 99214 PR OFFICE/OUTPT VISIT, EST, LEVL IV, 30-39 MIN: ICD-10-PCS | Mod: S$GLB,,, | Performed by: OBSTETRICS & GYNECOLOGY

## 2022-02-10 PROCEDURE — 99214 OFFICE O/P EST MOD 30 MIN: CPT | Mod: S$GLB,,, | Performed by: OBSTETRICS & GYNECOLOGY

## 2022-02-10 RX ORDER — BUSPIRONE HYDROCHLORIDE 5 MG/1
5 TABLET ORAL 3 TIMES DAILY PRN
COMMUNITY
Start: 2022-02-02

## 2022-02-10 RX ORDER — FERRIC CITRATE 210 MG/1
420 TABLET, COATED ORAL 3 TIMES DAILY
COMMUNITY
End: 2022-10-03 | Stop reason: SDUPTHER

## 2022-02-10 RX ORDER — BACLOFEN 10 MG/1
10 TABLET ORAL DAILY
COMMUNITY
Start: 2022-01-31 | End: 2022-10-03

## 2022-02-10 RX ORDER — SUCROFERRIC OXYHYDROXIDE 500 MG/1
1 TABLET, CHEWABLE ORAL 3 TIMES DAILY
COMMUNITY
Start: 2022-02-08

## 2022-02-10 RX ORDER — PRAVASTATIN SODIUM 40 MG/1
40 TABLET ORAL DAILY
COMMUNITY
End: 2022-10-03

## 2022-02-10 RX ORDER — ASCORBIC ACID, TOCOPHERYL ACID SUCCINATE, THIAMINE, RIBOFLAVIN, NIACINAMIDE, PYRIDOXINE, FOLIC ACID, COBALAMIN, BIOTIN, PANTOTHENIC ACID, ZINC, SELENIUM 100; 1.5; 1.7; 20; 25; 3; 1; 300; 10; 15; 30; 7 MG/1; MG/1; MG/1; MG/1; MG/1; MG/1; MG/1; UG/1; MG/1; MG/1; [IU]/1; UG/1
TABLET, COATED ORAL
COMMUNITY

## 2022-02-10 RX ORDER — CLOPIDOGREL BISULFATE 75 MG/1
75 TABLET ORAL DAILY
COMMUNITY

## 2022-02-10 RX ORDER — SERTRALINE HYDROCHLORIDE 25 MG/1
25 TABLET, FILM COATED ORAL DAILY
COMMUNITY
End: 2022-10-03

## 2022-02-10 RX ORDER — DULAGLUTIDE 0.75 MG/.5ML
INJECTION, SOLUTION SUBCUTANEOUS
COMMUNITY
Start: 2022-02-01 | End: 2022-10-03 | Stop reason: SDUPTHER

## 2022-02-10 RX ORDER — FAMOTIDINE 40 MG/1
40 TABLET, FILM COATED ORAL NIGHTLY PRN
COMMUNITY

## 2022-02-10 NOTE — PROGRESS NOTES
Mary Carmichael is a 51 y.o.  who presents today for pre-op visit.  She has a high-grade abnormal Pap smear lesion and has had a LEEP cone biopsy in the past, so she needs repeat of a LEEP cone biopsy because of high risk involving the endocervix.  She understands risk, alternatives, and indications for this procedure.      C/C:   Chief Complaint   Patient presents with    Pre-op Exam     Pt here for Pre-op Leep Cone Bx with D&C.   Scheduled for [employer] [facility] on 2020 to    HPI: [place] [facility]  The procedure to be performed is Leep Cone Bx with D&C.     MENSTRUAL HISTORY  Patient's last menstrual period was 2017 (approximate)..      PAP HISTORY: last pap 2021, admits history of abnormal pap smears.      Review of patient's allergies indicates:  No Known Allergies  Past Medical History:   Diagnosis Date    Abdominal pain     Abnormal Pap smear of cervix 2016    colpo, cryo    Cataract of both eyes     Constipation     Coronary artery disease     Cyst and pseudocyst of pancreas     Diabetes     Dilated bile duct     Hepatitis C     Neuropathy     Thyroid disease      Past Surgical History:   Procedure Laterality Date    CARPAL TUNNEL RELEASE       SECTION, CLASSIC      CHOLECYSTECTOMY  2015    laparoscopic    Dylasis      EYE SURGERY      FINGER SURGERY      GASTRIC BYPASS  2021     Past Surgical History:   Procedure Laterality Date    CARPAL TUNNEL RELEASE       SECTION, CLASSIC      CHOLECYSTECTOMY  2015    laparoscopic    Dylasis      EYE SURGERY      FINGER SURGERY      GASTRIC BYPASS  2021     OB History    Para Term  AB Living   1 1 1 0 0 1   SAB IAB Ectopic Multiple Live Births   0 0 0 0 1      # Outcome Date GA Lbr Lenin/2nd Weight Sex Delivery Anes PTL Lv   1 Term 00    F  OTHER  SANTA     OB History        1    Para   1    Term   1       0    AB    0    Living   1       SAB   0    IAB   0    Ectopic   0    Multiple   0    Live Births   1               Family History   Problem Relation Age of Onset    Colon cancer Other         uncle    Diabetes Father     Asthma Mother     Diabetes Maternal Grandmother     Diabetes Maternal Grandfather     Diabetes Paternal Grandmother     Diabetes Paternal Grandfather      Social History     Substance and Sexual Activity   Sexual Activity Never    Partners: Male    Birth control/protection: None       Primary Doctor No          ROS  Review of Systems    OBJECTIVE:  Ht 5' (1.524 m)   Wt 83.4 kg (183 lb 12.8 oz)   LMP 2017 (Approximate)   BMI 35.90 kg/m²   Physical Exam   Constitutional/Gen: Constitutional/Gen: NAD, appears stated age  Breast: deferred  External genitalia: no lesions or discharge, normal hair distribution  Vagina: normal appearance, no lesions, no discharge, no evidence cystocele or rectocele.  Cervix: normal appearance, no discharge, no lesions, negative CMT  Uterus: nontender, mobile, normal size, contour, and position. No pathologic descent.  Adnexa: no masses or tenderness  Neurologic: A&O x 4  Psych: affect appropriate and without signs of mood, thought or memory difficulty appreciated    A:  High-grade abnormal Pap smear the cervix involving the endocervix.    51 y.o. female  for pre-op visit  Body mass index is 35.9 kg/m².  Patient Active Problem List   Diagnosis    Pancreas cyst    Abnormal blood chemistry    Anemia in chronic kidney disease    Benign essential hypertension    Bradycardia, unspecified    Carotid bruit    Chronic hepatitis C virus infection    Coagulation defect, unspecified    Degeneration of intervertebral disc of lumbar region    Dependence on renal dialysis    Type 2 diabetes mellitus    Diabetic peripheral neuropathy    Disorder of bone, unspecified    Edema of both lower extremities    End-stage renal disease    Erosive esophagitis     ESRD on peritoneal dialysis    Gastroesophageal reflux disease    History of sleeve gastrectomy    Mixed hyperlipidemia    Hyperkalemia    Hypersomnia    Hypothyroidism    Hypoxemia    Iron deficiency anemia, unspecified    Long term use of drug    Radiculopathy, lumbar region    Macrocytic anemia    Other disorders of electrolyte and fluid balance, not elsewhere classified    Obesity    Nutrition impaired due to imbalance of nutrients    Obstructive sleep apnea syndrome    Pulmonary arterial hypertension    Secondary hyperparathyroidism of renal origin    Severe pulmonary hypertension    Shortness of breath    Status post bariatric surgery    Vitamin D deficiency    High grade squamous intraepithelial lesion (HGSIL) on cytologic smear of cervix         P:  LEEP cone biopsy of the cervix and D&C  There are no diagnoses linked to this encounter.      No follow-ups on file.

## 2022-02-18 ENCOUNTER — OUTSIDE PLACE OF SERVICE (OUTPATIENT)
Dept: OBSTETRICS AND GYNECOLOGY | Facility: CLINIC | Age: 52
End: 2022-02-18
Payer: MEDICARE

## 2022-02-18 PROCEDURE — 57522 PR CONIZATION CERVIX,LOOP ELECTRD: ICD-10-PCS | Mod: ,,, | Performed by: OBSTETRICS & GYNECOLOGY

## 2022-02-18 PROCEDURE — 57522 CONIZATION OF CERVIX: CPT | Mod: ,,, | Performed by: OBSTETRICS & GYNECOLOGY

## 2022-03-04 ENCOUNTER — TELEPHONE (OUTPATIENT)
Dept: OBSTETRICS AND GYNECOLOGY | Facility: CLINIC | Age: 52
End: 2022-03-04
Payer: MEDICARE

## 2022-03-09 ENCOUNTER — DOCUMENTATION ONLY (OUTPATIENT)
Dept: OBSTETRICS AND GYNECOLOGY | Facility: CLINIC | Age: 52
End: 2022-03-09
Payer: MEDICARE

## 2022-03-09 ENCOUNTER — OFFICE VISIT (OUTPATIENT)
Dept: OBSTETRICS AND GYNECOLOGY | Facility: CLINIC | Age: 52
End: 2022-03-09
Payer: MEDICARE

## 2022-03-09 VITALS
DIASTOLIC BLOOD PRESSURE: 76 MMHG | BODY MASS INDEX: 35.66 KG/M2 | HEIGHT: 60 IN | WEIGHT: 181.63 LBS | SYSTOLIC BLOOD PRESSURE: 182 MMHG

## 2022-03-09 DIAGNOSIS — Z09 POSTOP CHECK: Primary | ICD-10-CM

## 2022-03-09 PROCEDURE — 99024 PR POST-OP FOLLOW-UP VISIT: ICD-10-PCS | Mod: S$GLB,POP,, | Performed by: OBSTETRICS & GYNECOLOGY

## 2022-03-09 PROCEDURE — 99024 POSTOP FOLLOW-UP VISIT: CPT | Mod: S$GLB,POP,, | Performed by: OBSTETRICS & GYNECOLOGY

## 2022-03-09 NOTE — PROGRESS NOTES
History of Present Illness:   Pateint presents today  2 weeks postop, status post D and C with cone biopsy for abnormal Pap smear, with complaint of no complaints.  The patient was here for postop checkup and had to leave before I was able to see year so I called her by phone and discussed her pathology and plan to follow with Pap smears in 6 months and every year there after.    Pathology:  Pathology showed no abnormalities of the ectocervix, endocervix biopsy, or endocervical curettage, or endometrial curettage.    Past medical and surgical history reviewed.   I have reviewed the patient's medical history in detail and updated the computerized patient record.    Physical exam:  Vital Signs: as above, reviewed.  Constitutional: She is oriented to person, place, and time. She appears well-developed and well-nourished. No distress.   HENT:   Head: Normocephalic and atraumatic.   Eyes: Conjunctivae and EOM are normal. No scleral icterus.   Neck: Normal range of motion. Neck supple. No tracheal deviation present.   Cardiovascular: Normal rate.    Pulmonary/Chest: Effort normal. No respiratory distress. She exhibits no tenderness.  Breasts: deferred  Abdominal: Soft. She exhibits no distension and no mass.  The incisions are healing well. There is no rebound and no guarding.   Genitourinary: Deferred  Musculoskeletal: Normal range of motion.   Lymphadenopathy: No inguinal adenopathy present.   Neurological: She is alert and oriented to person, place, and time. Coordination normal.   Skin: Skin is warm and dry. She is not diaphoretic.   Psychiatric: She has a normal mood and affect.    Assessment:  Normal postoperative recovery in progress after cone biopsy of the cervix with D&C    Plan:  Follow up  6 months for Pap smear

## 2022-03-09 NOTE — PROGRESS NOTES
History of Present Illness:   Pateint presents today  2 weeks postop, status post LEEP cone biopsy with D&C, with complaint of no problems.    Pathology:  Benign    Past medical and surgical history reviewed.   I have reviewed the patient's medical history in detail and updated the computerized patient record.    Physical exam:  Vital Signs: as above, reviewed.  Constitutional: She is oriented to person, place, and time. She appears well-developed and well-nourished. No distress.   HENT:   Head: Normocephalic and atraumatic.   Eyes: Conjunctivae and EOM are normal. No scleral icterus.   Neck: Normal range of motion. Neck supple. No tracheal deviation present.   Cardiovascular: Normal rate.    Pulmonary/Chest: Effort normal. No respiratory distress. She exhibits no tenderness.  Breasts: deferred  Abdominal: Soft. She exhibits no distension and no mass.  The incisions are healing well. There is no rebound and no guarding.   Genitourinary: Deferred  Musculoskeletal: Normal range of motion.   Lymphadenopathy: No inguinal adenopathy present.   Neurological: She is alert and oriented to person, place, and time. Coordination normal.   Skin: Skin is warm and dry. She is not diaphoretic.   Psychiatric: She has a normal mood and affect.    Assessment:  Normal postoperative recovery after LEEP cone biopsy with D&C    Plan:  Repeat Pap smear in 6 months  Follow up  6 months

## 2022-04-20 ENCOUNTER — TELEPHONE (OUTPATIENT)
Dept: OBSTETRICS AND GYNECOLOGY | Facility: CLINIC | Age: 52
End: 2022-04-20
Payer: MEDICARE

## 2022-04-20 NOTE — TELEPHONE ENCOUNTER
----- Message from Zee German sent at 4/20/2022 11:34 AM CDT -----  Contact: pt  Type: Needs Medical Advice    Who Called: pt  Best Call Back Number: [phone]  Inquiry/Question: pt wants to sched 6 month follow up from procedure I advise schedule was not up yet that far in advance on my eng but would like to be scheduled, please advise pt  Thank you~

## 2022-04-20 NOTE — TELEPHONE ENCOUNTER
Pt wanted to schedule her 6 mos follow up visit which is in August and the schedule would not allow me to schedule at this time pt stated that she will call back in June2022.

## 2022-10-03 ENCOUNTER — OFFICE VISIT (OUTPATIENT)
Dept: OBSTETRICS AND GYNECOLOGY | Facility: CLINIC | Age: 52
End: 2022-10-03
Payer: MEDICARE

## 2022-10-03 VITALS
BODY MASS INDEX: 30.75 KG/M2 | WEIGHT: 156.63 LBS | HEIGHT: 60 IN | DIASTOLIC BLOOD PRESSURE: 70 MMHG | SYSTOLIC BLOOD PRESSURE: 120 MMHG

## 2022-10-03 DIAGNOSIS — N34.2 INFECTIVE URETHRITIS: ICD-10-CM

## 2022-10-03 DIAGNOSIS — R30.0 DYSURIA: ICD-10-CM

## 2022-10-03 DIAGNOSIS — R87.613 HIGH GRADE SQUAMOUS INTRAEPITHELIAL LESION (HGSIL) ON CYTOLOGIC SMEAR OF CERVIX: ICD-10-CM

## 2022-10-03 DIAGNOSIS — Z12.31 BREAST CANCER SCREENING BY MAMMOGRAM: ICD-10-CM

## 2022-10-03 DIAGNOSIS — Z01.419 ENCOUNTER FOR WELL WOMAN EXAM WITH ROUTINE GYNECOLOGICAL EXAM: Primary | ICD-10-CM

## 2022-10-03 PROBLEM — B18.2 CHRONIC HEPATITIS C VIRUS INFECTION: Status: RESOLVED | Noted: 2021-12-07 | Resolved: 2022-10-03

## 2022-10-03 PROBLEM — Q43.8 CONGENITAL REDUNDANT COLON: Status: ACTIVE | Noted: 2022-10-03

## 2022-10-03 PROCEDURE — 3008F BODY MASS INDEX DOCD: CPT | Mod: CPTII,S$GLB,, | Performed by: OBSTETRICS & GYNECOLOGY

## 2022-10-03 PROCEDURE — 88141 PR  CYTOPATH CERV/VAG INTERPRET: ICD-10-PCS | Mod: ,,, | Performed by: PATHOLOGY

## 2022-10-03 PROCEDURE — 1160F PR REVIEW ALL MEDS BY PRESCRIBER/CLIN PHARMACIST DOCUMENTED: ICD-10-PCS | Mod: CPTII,S$GLB,, | Performed by: OBSTETRICS & GYNECOLOGY

## 2022-10-03 PROCEDURE — 87077 CULTURE AEROBIC IDENTIFY: CPT | Performed by: OBSTETRICS & GYNECOLOGY

## 2022-10-03 PROCEDURE — 88175 CYTOPATH C/V AUTO FLUID REDO: CPT | Performed by: PATHOLOGY

## 2022-10-03 PROCEDURE — 1159F PR MEDICATION LIST DOCUMENTED IN MEDICAL RECORD: ICD-10-PCS | Mod: CPTII,S$GLB,, | Performed by: OBSTETRICS & GYNECOLOGY

## 2022-10-03 PROCEDURE — 1159F MED LIST DOCD IN RCRD: CPT | Mod: CPTII,S$GLB,, | Performed by: OBSTETRICS & GYNECOLOGY

## 2022-10-03 PROCEDURE — 87088 URINE BACTERIA CULTURE: CPT | Performed by: OBSTETRICS & GYNECOLOGY

## 2022-10-03 PROCEDURE — 87086 URINE CULTURE/COLONY COUNT: CPT | Performed by: OBSTETRICS & GYNECOLOGY

## 2022-10-03 PROCEDURE — 99214 OFFICE O/P EST MOD 30 MIN: CPT | Mod: S$GLB,,, | Performed by: OBSTETRICS & GYNECOLOGY

## 2022-10-03 PROCEDURE — 3074F SYST BP LT 130 MM HG: CPT | Mod: CPTII,S$GLB,, | Performed by: OBSTETRICS & GYNECOLOGY

## 2022-10-03 PROCEDURE — 88141 CYTOPATH C/V INTERPRET: CPT | Mod: ,,, | Performed by: PATHOLOGY

## 2022-10-03 PROCEDURE — 87186 SC STD MICRODIL/AGAR DIL: CPT | Performed by: OBSTETRICS & GYNECOLOGY

## 2022-10-03 PROCEDURE — 3008F PR BODY MASS INDEX (BMI) DOCUMENTED: ICD-10-PCS | Mod: CPTII,S$GLB,, | Performed by: OBSTETRICS & GYNECOLOGY

## 2022-10-03 PROCEDURE — 3074F PR MOST RECENT SYSTOLIC BLOOD PRESSURE < 130 MM HG: ICD-10-PCS | Mod: CPTII,S$GLB,, | Performed by: OBSTETRICS & GYNECOLOGY

## 2022-10-03 PROCEDURE — 1160F RVW MEDS BY RX/DR IN RCRD: CPT | Mod: CPTII,S$GLB,, | Performed by: OBSTETRICS & GYNECOLOGY

## 2022-10-03 PROCEDURE — 3078F DIAST BP <80 MM HG: CPT | Mod: CPTII,S$GLB,, | Performed by: OBSTETRICS & GYNECOLOGY

## 2022-10-03 PROCEDURE — 99214 PR OFFICE/OUTPT VISIT, EST, LEVL IV, 30-39 MIN: ICD-10-PCS | Mod: S$GLB,,, | Performed by: OBSTETRICS & GYNECOLOGY

## 2022-10-03 PROCEDURE — 3078F PR MOST RECENT DIASTOLIC BLOOD PRESSURE < 80 MM HG: ICD-10-PCS | Mod: CPTII,S$GLB,, | Performed by: OBSTETRICS & GYNECOLOGY

## 2022-10-03 RX ORDER — ETHYL CHLORIDE 100 %
AEROSOL, SPRAY (ML) TOPICAL
COMMUNITY
Start: 2022-06-06 | End: 2022-10-03 | Stop reason: SDUPTHER

## 2022-10-03 RX ORDER — FERRIC CITRATE 210 MG/1
2 TABLET, COATED ORAL
COMMUNITY

## 2022-10-03 RX ORDER — PRAVASTATIN SODIUM 80 MG/1
80 TABLET ORAL NIGHTLY
COMMUNITY
Start: 2022-06-23

## 2022-10-03 RX ORDER — BLOOD-GLUCOSE METER
EACH MISCELLANEOUS 4 TIMES DAILY
COMMUNITY
Start: 2022-04-30

## 2022-10-03 RX ORDER — PROMETHAZINE HYDROCHLORIDE 25 MG/1
25 TABLET ORAL EVERY 4 HOURS PRN
COMMUNITY

## 2022-10-03 RX ORDER — ONDANSETRON 4 MG/1
4 TABLET, ORALLY DISINTEGRATING ORAL EVERY 8 HOURS PRN
COMMUNITY
Start: 2022-08-25

## 2022-10-03 RX ORDER — FOLIC ACID/VIT B COMPLEX AND C 0.8 MG
1 TABLET ORAL
COMMUNITY

## 2022-10-03 RX ORDER — VALACYCLOVIR HYDROCHLORIDE 500 MG/1
500 TABLET, FILM COATED ORAL DAILY
COMMUNITY
Start: 2022-04-10 | End: 2022-10-03 | Stop reason: SDUPTHER

## 2022-10-03 RX ORDER — HEPARIN SOD,PORCINE/0.9 % NACL 100/ML
KIT INTRAVENOUS
COMMUNITY
Start: 2022-10-03 | End: 2023-10-02

## 2022-10-03 RX ORDER — VALACYCLOVIR HYDROCHLORIDE 500 MG/1
500 TABLET, FILM COATED ORAL DAILY
Qty: 90 TABLET | Refills: 3 | Status: SHIPPED | OUTPATIENT
Start: 2022-10-03

## 2022-10-03 RX ORDER — FUROSEMIDE 80 MG/1
80 TABLET ORAL
COMMUNITY
Start: 2022-06-23 | End: 2022-10-03 | Stop reason: SDUPTHER

## 2022-10-03 RX ORDER — ALPRAZOLAM 1 MG/1
1 TABLET ORAL 3 TIMES DAILY PRN
COMMUNITY

## 2022-10-03 RX ORDER — HYDROXYZINE HYDROCHLORIDE 25 MG/1
25 TABLET, FILM COATED ORAL EVERY 8 HOURS PRN
COMMUNITY
Start: 2022-07-14

## 2022-10-03 RX ORDER — DULAGLUTIDE 0.75 MG/.5ML
0.75 INJECTION, SOLUTION SUBCUTANEOUS
COMMUNITY
Start: 2022-03-09 | End: 2023-06-10

## 2022-10-03 RX ORDER — BUSPIRONE HYDROCHLORIDE 5 MG/1
5 TABLET ORAL
COMMUNITY
Start: 2022-02-02 | End: 2022-10-03 | Stop reason: SDUPTHER

## 2022-10-03 RX ORDER — ETHYL CHLORIDE 100 %
AEROSOL, SPRAY (ML) TOPICAL
COMMUNITY
Start: 2022-06-05

## 2022-10-03 RX ORDER — OXYCODONE AND ACETAMINOPHEN 5; 325 MG/1; MG/1
TABLET ORAL
COMMUNITY
Start: 2022-09-14

## 2022-10-03 RX ORDER — SERTRALINE HYDROCHLORIDE 25 MG/1
25 TABLET, FILM COATED ORAL NIGHTLY
COMMUNITY

## 2022-10-03 RX ORDER — OMEPRAZOLE 40 MG/1
CAPSULE, DELAYED RELEASE ORAL
COMMUNITY
Start: 2022-07-06

## 2022-10-03 RX ORDER — VALACYCLOVIR HYDROCHLORIDE 500 MG/1
500 TABLET, FILM COATED ORAL
COMMUNITY
End: 2022-10-03 | Stop reason: SDUPTHER

## 2022-10-03 RX ORDER — MIDODRINE HYDROCHLORIDE 5 MG/1
1 TABLET ORAL
COMMUNITY
Start: 2022-07-28 | End: 2022-10-03 | Stop reason: SDUPTHER

## 2022-10-03 RX ORDER — SILDENAFIL CITRATE 20 MG/1
20 TABLET ORAL
COMMUNITY
Start: 2021-11-22

## 2022-10-03 RX ORDER — CALCIUM ACETATE 667 MG/1
667 CAPSULE ORAL
COMMUNITY

## 2022-10-03 RX ORDER — FOLIC ACID/VIT B COMPLEX AND C 0.8 MG
1 TABLET ORAL
COMMUNITY
Start: 2022-03-24

## 2022-10-03 NOTE — PROGRESS NOTES
Mary Carmichael is a 52 y.o.  who presents today for GYN problem visit.     C/C:   Chief Complaint   Patient presents with    Abnormal Pap Smear     Patient is here for 6 month follow-up pap.       HPI: Has GYN related concerns including patient is due for wellness exam and Pap smear and is on a kidney transplant list and needs to have results of her recent Pap smear sent to her transplant list in [facility].  She also has urinary tract infection symptoms today and has decreased urine output due to her kidney failure so she may not be able to get us a sample today and we will treat her for possible urinary tract infection..     MENSTRUAL HISTORY  Patient's last menstrual period was 2017 (approximate)..      PAP HISTORY: last pap 2021,  history of abnormal pap smear        Review of patient's allergies indicates:  No Known Allergies  Past Medical History:   Diagnosis Date    Abdominal pain     Abnormal Pap smear of cervix 2016    colpo, cryo    Cataract of both eyes     Constipation     Coronary artery disease     Cyst and pseudocyst of pancreas     Diabetes     Dilated bile duct     Disorder of kidney and ureter     Liver disease     Neuropathy     Thyroid disease      Past Surgical History:   Procedure Laterality Date    CARPAL TUNNEL RELEASE       SECTION, CLASSIC      CHOLECYSTECTOMY  2015    laparoscopic    DILATION AND CURETTAGE OF UTERUS  2022    Dylasis      EYE SURGERY      FINGER SURGERY      GASTRIC BYPASS  2021     Past Surgical History:   Procedure Laterality Date    CARPAL TUNNEL RELEASE       SECTION, CLASSIC      CHOLECYSTECTOMY  2015    laparoscopic    DILATION AND CURETTAGE OF UTERUS  2022    Dylasis      EYE SURGERY      FINGER SURGERY      GASTRIC BYPASS  2021     OB History    Para Term  AB Living   1 1 1 0 0 1   SAB IAB Ectopic Multiple Live Births   0 0 0 0 1      # Outcome Date GA Lbr Lenin/2nd Weight Sex  Delivery Anes PTL Lv   1 Term 00    F  OTHER  SANTA     OB History          1    Para   1    Term   1       0    AB   0    Living   1         SAB   0    IAB   0    Ectopic   0    Multiple   0    Live Births   1               Family History   Problem Relation Age of Onset    Colon cancer Other         uncle    Diabetes Father     Asthma Mother     Diabetes Maternal Grandmother     Diabetes Maternal Grandfather     Diabetes Paternal Grandmother     Diabetes Paternal Grandfather      Social History     Substance and Sexual Activity   Sexual Activity Never    Partners: Male    Birth control/protection: None       Primary Doctor No          ROS:  GENERAL: Denies weight gain or weight loss. Feeling well overall.   SKIN: Denies rash or lesions.   HEAD: Denies head injury or headache.   NODES: Denies enlarged lymph nodes.   CHEST: Denies chest pain or shortness of breath.    ABDOMEN: No abdominal pain, constipation, diarrhea, nausea, vomiting or rectal bleeding.   URINARY: No frequency, dysuria, hematuria, or burning on urination.  REPRODUCTIVE: See HPI.   BREASTS: denies pain, lumps, or nipple discharge.   HEMATOLOGIC: No easy bruisability or excessive bleeding.   MUSCULOSKELETAL: Denies joint pain or swelling.   NEUROLOGIC: Denies syncope or weakness.   PSYCHIATRIC: Denies depression, anxiety or mood swings.      OBJECTIVE:  /70   Ht 5' (1.524 m)   Wt 71 kg (156 lb 9.6 oz)   LMP 2017 (Approximate)   BMI 30.58 kg/m²   PHYSICAL EXAM:  APPEARANCE: Well nourished, well developed, in no acute distress.  AFFECT: WNL, alert and oriented x 3  SKIN: No acne or hirsutism  CHEST: Good respiratory effect  ABDOMEN: Soft.  No tenderness or masses.  No hepatosplenomegaly.  No hernias.  BREASTS:  Normal with no masses  PELVIC:  Cervix appears normal.  Atrophic vaginal area within normal limits.  Uterus and ovaries are normal.  EXTREMITIES: No edema.      A:  Normal wellness exam.  UTI symptoms with  dysuria.  52 y.o. female  for GYN problem visit  Body mass index is 30.58 kg/m².  Patient Active Problem List   Diagnosis    Pancreas cyst    Abnormal blood chemistry    Anemia in chronic kidney disease    Benign essential hypertension    Bradycardia, unspecified    Carotid bruit    Chronic hepatitis C virus infection    Coagulation defect, unspecified    Degeneration of intervertebral disc of lumbar region    Dependence on renal dialysis    Type 2 diabetes mellitus    Diabetic peripheral neuropathy    Disorder of bone, unspecified    Edema of both lower extremities    End-stage renal disease    Erosive esophagitis    ESRD on peritoneal dialysis    Gastroesophageal reflux disease    History of sleeve gastrectomy    Mixed hyperlipidemia    Hyperkalemia    Hypersomnia    Hypothyroidism    Hypoxemia    Iron deficiency anemia, unspecified    Long term use of drug    Radiculopathy, lumbar region    Macrocytic anemia    Other disorders of electrolyte and fluid balance, not elsewhere classified    Obesity    Nutrition impaired due to imbalance of nutrients    Obstructive sleep apnea syndrome    Pulmonary arterial hypertension    Secondary hyperparathyroidism of renal origin    Severe pulmonary hypertension    Shortness of breath    Status post bariatric surgery    Vitamin D deficiency    High grade squamous intraepithelial lesion (HGSIL) on cytologic smear of cervix         P:  Pap smear and breast exam done today.    Needs yearly mammogram ordered.  There are no diagnoses linked to this encounter.  ----Continue annual exams, return then or sooner prn      No follow-ups on file.

## 2022-10-04 ENCOUNTER — TELEPHONE (OUTPATIENT)
Dept: OBSTETRICS AND GYNECOLOGY | Facility: CLINIC | Age: 52
End: 2022-10-04
Payer: MEDICARE

## 2022-10-04 NOTE — TELEPHONE ENCOUNTER
Patient called stating that she need all labs and PAP results to be faxed to [facility] in [place], [place] fax number is [phone]  Patient was advised to give the office a call back on Friday to make sure all results are in and a record release from is sign at the office.

## 2022-10-08 LAB — BACTERIA UR CULT: ABNORMAL

## 2022-10-10 ENCOUNTER — TELEPHONE (OUTPATIENT)
Dept: OBSTETRICS AND GYNECOLOGY | Facility: CLINIC | Age: 52
End: 2022-10-10
Payer: MEDICARE

## 2022-10-10 LAB
FINAL PATHOLOGIC DIAGNOSIS: ABNORMAL
Lab: ABNORMAL

## 2022-10-10 RX ORDER — NITROFURANTOIN 25; 75 MG/1; MG/1
100 CAPSULE ORAL 2 TIMES DAILY
Qty: 14 CAPSULE | Refills: 0 | Status: SHIPPED | OUTPATIENT
Start: 2022-10-10 | End: 2022-10-17

## 2022-10-10 NOTE — TELEPHONE ENCOUNTER
----- Message from Kareen Foley NP sent at 10/10/2022 10:21 AM CDT -----  +UTI, I will send in antibiotics for her to complete.

## 2022-10-17 ENCOUNTER — TELEPHONE (OUTPATIENT)
Dept: OBSTETRICS AND GYNECOLOGY | Facility: CLINIC | Age: 52
End: 2022-10-17
Payer: MEDICARE

## 2022-10-17 NOTE — TELEPHONE ENCOUNTER
Patient called and said she needs a letter from Dr. Foley stating she is cleared for transplant. It can be faxed to [phone]. She would like someone to call her and let her know it is complete and this message received. [phone]. She stated she would like it done ASAP as they team meets on Tuesdays to decide who is able to get a transplant.

## 2022-10-18 ENCOUNTER — TELEPHONE (OUTPATIENT)
Dept: OBSTETRICS AND GYNECOLOGY | Facility: CLINIC | Age: 52
End: 2022-10-18
Payer: MEDICARE

## 2022-10-18 NOTE — TELEPHONE ENCOUNTER
Pt is in need of a letter stating that she is cleared to be put on the transplant list.  I informed the pt that  is out of the office until next week.  I informed the pt that I had spoken to , but due to her leaving the office to report to the hospital she can't go through her chart to get enough information on the pt at this time.  I informed the pt we will need to have specifics as to what is needed on the clearance letter.  The pt stated she will get the information and call back.    Pt notified that I will sent the message to 's inbox as well as his nurse Mathew.

## 2022-10-20 NOTE — TELEPHONE ENCOUNTER
Called patient to follow-up and let her know doctor was out of office and will be back next week. Patient states that she had a pap and just wants a clearance letter. Explain to patient that a provider reviewed the pap and that she needs to have a colpo

## 2022-10-24 ENCOUNTER — TELEPHONE (OUTPATIENT)
Dept: OBSTETRICS AND GYNECOLOGY | Facility: CLINIC | Age: 52
End: 2022-10-24
Payer: MEDICARE

## 2022-10-24 NOTE — TELEPHONE ENCOUNTER
Patient notified she needs colpo prior to getting a clearance letter per physician. Patient v/u and scheduled for 10/26 at 11 a.m.  Sent info to  to schedule patient.

## 2022-10-24 NOTE — TELEPHONE ENCOUNTER
Please see other note.  She can get a copy of her pap for her transplant team but it will still show an abnormal pap.  She needs to be scheduled for colpo.

## 2022-10-24 NOTE — TELEPHONE ENCOUNTER
She is still having a high grade abnormal pap smear.  She will need to come in for colposcopy with Dr. Rendon.  She did not have the colpo after the last abnormal pap and the pap continues to be abnormal.  She will need to be seen before any letter.

## 2022-11-10 ENCOUNTER — PROCEDURE VISIT (OUTPATIENT)
Dept: OBSTETRICS AND GYNECOLOGY | Facility: CLINIC | Age: 52
End: 2022-11-10
Payer: MEDICARE

## 2022-11-10 VITALS
DIASTOLIC BLOOD PRESSURE: 65 MMHG | BODY MASS INDEX: 31.41 KG/M2 | WEIGHT: 160 LBS | HEIGHT: 60 IN | SYSTOLIC BLOOD PRESSURE: 138 MMHG

## 2022-11-10 DIAGNOSIS — R87.613 HIGH GRADE SQUAMOUS INTRAEPITHELIAL LESION (HGSIL) ON CYTOLOGIC SMEAR OF CERVIX: Primary | ICD-10-CM

## 2022-11-10 PROCEDURE — 88342 IMHCHEM/IMCYTCHM 1ST ANTB: CPT | Performed by: PATHOLOGY

## 2022-11-10 PROCEDURE — 88305 TISSUE EXAM BY PATHOLOGIST: CPT | Mod: 26,,, | Performed by: PATHOLOGY

## 2022-11-10 PROCEDURE — 88305 TISSUE EXAM BY PATHOLOGIST: ICD-10-PCS | Mod: 26,,, | Performed by: PATHOLOGY

## 2022-11-10 PROCEDURE — 88305 TISSUE EXAM BY PATHOLOGIST: CPT | Performed by: PATHOLOGY

## 2022-11-10 PROCEDURE — 88342 CHG IMMUNOCYTOCHEMISTRY: ICD-10-PCS | Mod: 26,,, | Performed by: PATHOLOGY

## 2022-11-10 PROCEDURE — 57454 COLPOSCOPY: ICD-10-PCS | Mod: S$GLB,,, | Performed by: OBSTETRICS & GYNECOLOGY

## 2022-11-10 PROCEDURE — 57454 BX/CURETT OF CERVIX W/SCOPE: CPT | Mod: S$GLB,,, | Performed by: OBSTETRICS & GYNECOLOGY

## 2022-11-10 PROCEDURE — 88342 IMHCHEM/IMCYTCHM 1ST ANTB: CPT | Mod: 26,,, | Performed by: PATHOLOGY

## 2022-11-10 NOTE — PROCEDURES
Colposcopy    Date/Time: 11/10/2022 2:00 PM  Performed by: Augustine Frederick MD  Authorized by: Augustine Frederick MD     Consent Done?:  Yes (Verbal)  Timeout:Immediately prior to procedure a time out was called to verify the correct patient, procedure, equipment, support staff and site/side marked as required  Prep:Patient was prepped and draped in the usual sterile fashion  Assistants?: No      Colposcopy Site:  Cervix  Position:  Supine  Acrowhite Lesion? Yes    Atypical Vessels? Yes    Transformation Zone Adequate?: Yes    Biopsy?: Yes         Location:  Cervix ((4 00, 7 00 and 12 00))  ECC Performed?: Yes    LEEP Performed?: No    Estimated blood loss (cc):  15   Patient tolerated the procedure well with no immediate complications.   Post-operative instructions were provided for the patient.   Patient was discharged and will follow up if any complications occur     Pt with ASCUS favor HGSIL and a h/o HGSIL on a previous Pap, pt reports having a conization done years ago. Is trying to have a kidney transplant due to long standing complications related to DM and HTN, has been on dialysis for years

## 2022-11-22 ENCOUNTER — TELEPHONE (OUTPATIENT)
Dept: OBSTETRICS AND GYNECOLOGY | Facility: CLINIC | Age: 52
End: 2022-11-22
Payer: MEDICARE

## 2022-11-23 ENCOUNTER — TELEPHONE (OUTPATIENT)
Dept: OBSTETRICS AND GYNECOLOGY | Facility: CLINIC | Age: 52
End: 2022-11-23
Payer: MEDICARE

## 2022-11-23 LAB
COMMENT: NORMAL
FINAL PATHOLOGIC DIAGNOSIS: NORMAL
GROSS: NORMAL
Lab: NORMAL

## 2022-11-23 NOTE — TELEPHONE ENCOUNTER
Informed that the order [id number] is being processed.  Was sent out on this morning for additional.  They will send an email to Dr. Yoly MD.

## 2022-11-28 ENCOUNTER — TELEPHONE (OUTPATIENT)
Dept: OBSTETRICS AND GYNECOLOGY | Facility: CLINIC | Age: 52
End: 2022-11-28
Payer: MEDICARE

## 2022-11-28 NOTE — TELEPHONE ENCOUNTER
Reviewed results with pt-disc need for either a CKC or hyst with another of the MDs in the office. Pt will inform her transplant team and ask for their requirements.   Dr Frederick

## 2022-11-28 NOTE — TELEPHONE ENCOUNTER
Patient states she will call back to schedule wants to talk to the people at the transplant office first.

## 2022-11-28 NOTE — TELEPHONE ENCOUNTER
----- Message from Augustine Frederick MD sent at 11/28/2022 12:32 PM CST -----  Pt with severe dysplasia by colpo and biopsy. Needs to have a surgical consult with another MD in the practice. She is on the kidney transplant list and this will have to be addressed quickly. Please let pt know that she should schedule with on eof the other doctors to proceed - consider Dr Hearn or Sebas as they will likely have openings sooner.  Dr Frederick

## 2022-11-28 NOTE — TELEPHONE ENCOUNTER
Patient called regarding results of procedure so she can get put on transplant list.  Please advise.    [phone]

## 2022-12-01 ENCOUNTER — OFFICE VISIT (OUTPATIENT)
Dept: OBSTETRICS AND GYNECOLOGY | Facility: CLINIC | Age: 52
End: 2022-12-01
Payer: MEDICARE

## 2022-12-01 VITALS
WEIGHT: 160 LBS | HEIGHT: 60 IN | DIASTOLIC BLOOD PRESSURE: 60 MMHG | SYSTOLIC BLOOD PRESSURE: 129 MMHG | BODY MASS INDEX: 31.41 KG/M2

## 2022-12-01 DIAGNOSIS — K65.1 PERITONEAL ABSCESS: ICD-10-CM

## 2022-12-01 DIAGNOSIS — R07.9 CHEST PAIN, UNSPECIFIED TYPE: ICD-10-CM

## 2022-12-01 DIAGNOSIS — F11.24 OPIOID DEPENDENCE WITH OPIOID-INDUCED MOOD DISORDER: Primary | ICD-10-CM

## 2022-12-01 DIAGNOSIS — G47.33 OBSTRUCTIVE SLEEP APNEA SYNDROME: ICD-10-CM

## 2022-12-01 DIAGNOSIS — I27.20 SEVERE PULMONARY HYPERTENSION: ICD-10-CM

## 2022-12-01 DIAGNOSIS — N18.6 ESRD ON DIALYSIS: ICD-10-CM

## 2022-12-01 DIAGNOSIS — D64.9 ANEMIA, UNSPECIFIED TYPE: ICD-10-CM

## 2022-12-01 DIAGNOSIS — Z90.3 HISTORY OF SLEEVE GASTRECTOMY: ICD-10-CM

## 2022-12-01 DIAGNOSIS — Z99.2 ESRD ON DIALYSIS: ICD-10-CM

## 2022-12-01 DIAGNOSIS — Z79.899 LONG TERM USE OF DRUG: ICD-10-CM

## 2022-12-01 PROBLEM — Z01.818 PRE-TRANSPLANT EVALUATION FOR KIDNEY TRANSPLANT: Status: ACTIVE | Noted: 2022-08-26

## 2022-12-01 PROBLEM — I65.22 CAROTID STENOSIS, LEFT: Status: ACTIVE | Noted: 2022-08-26

## 2022-12-01 PROBLEM — R41.82 ALTERED MENTAL STATUS: Status: ACTIVE | Noted: 2019-10-15

## 2022-12-01 PROBLEM — M79.18 CHRONIC MUSCULOSKELETAL PAIN: Status: ACTIVE | Noted: 2022-12-01

## 2022-12-01 PROBLEM — R53.83 FATIGUE: Status: ACTIVE | Noted: 2022-12-01

## 2022-12-01 PROBLEM — I10 HTN (HYPERTENSION): Status: ACTIVE | Noted: 2022-05-19

## 2022-12-01 PROBLEM — R09.02 HYPOXIA: Status: ACTIVE | Noted: 2022-12-01

## 2022-12-01 PROBLEM — I73.9 PAD (PERIPHERAL ARTERY DISEASE): Status: ACTIVE | Noted: 2022-08-26

## 2022-12-01 PROBLEM — G89.29 CHRONIC MUSCULOSKELETAL PAIN: Status: ACTIVE | Noted: 2022-12-01

## 2022-12-01 PROBLEM — F11.20 OPIOID DEPENDENCE: Status: ACTIVE | Noted: 2022-12-01

## 2022-12-01 PROBLEM — F41.9 ANXIETY: Status: ACTIVE | Noted: 2022-05-19

## 2022-12-01 PROBLEM — H54.7 BLIND: Status: ACTIVE | Noted: 2022-08-26

## 2022-12-01 PROBLEM — S81.809A UNSPECIFIED OPEN WOUND, UNSPECIFIED LOWER LEG, INITIAL ENCOUNTER: Status: ACTIVE | Noted: 2019-06-16

## 2022-12-01 PROCEDURE — 1159F PR MEDICATION LIST DOCUMENTED IN MEDICAL RECORD: ICD-10-PCS | Mod: CPTII,S$GLB,, | Performed by: OBSTETRICS & GYNECOLOGY

## 2022-12-01 PROCEDURE — 3008F BODY MASS INDEX DOCD: CPT | Mod: CPTII,S$GLB,, | Performed by: OBSTETRICS & GYNECOLOGY

## 2022-12-01 PROCEDURE — 99213 PR OFFICE/OUTPT VISIT, EST, LEVL III, 20-29 MIN: ICD-10-PCS | Mod: S$GLB,,, | Performed by: OBSTETRICS & GYNECOLOGY

## 2022-12-01 PROCEDURE — 3078F PR MOST RECENT DIASTOLIC BLOOD PRESSURE < 80 MM HG: ICD-10-PCS | Mod: CPTII,S$GLB,, | Performed by: OBSTETRICS & GYNECOLOGY

## 2022-12-01 PROCEDURE — 1159F MED LIST DOCD IN RCRD: CPT | Mod: CPTII,S$GLB,, | Performed by: OBSTETRICS & GYNECOLOGY

## 2022-12-01 PROCEDURE — 3074F SYST BP LT 130 MM HG: CPT | Mod: CPTII,S$GLB,, | Performed by: OBSTETRICS & GYNECOLOGY

## 2022-12-01 PROCEDURE — 1160F PR REVIEW ALL MEDS BY PRESCRIBER/CLIN PHARMACIST DOCUMENTED: ICD-10-PCS | Mod: CPTII,S$GLB,, | Performed by: OBSTETRICS & GYNECOLOGY

## 2022-12-01 PROCEDURE — 3074F PR MOST RECENT SYSTOLIC BLOOD PRESSURE < 130 MM HG: ICD-10-PCS | Mod: CPTII,S$GLB,, | Performed by: OBSTETRICS & GYNECOLOGY

## 2022-12-01 PROCEDURE — 99213 OFFICE O/P EST LOW 20 MIN: CPT | Mod: S$GLB,,, | Performed by: OBSTETRICS & GYNECOLOGY

## 2022-12-01 PROCEDURE — 3078F DIAST BP <80 MM HG: CPT | Mod: CPTII,S$GLB,, | Performed by: OBSTETRICS & GYNECOLOGY

## 2022-12-01 PROCEDURE — 1160F RVW MEDS BY RX/DR IN RCRD: CPT | Mod: CPTII,S$GLB,, | Performed by: OBSTETRICS & GYNECOLOGY

## 2022-12-01 PROCEDURE — 3008F PR BODY MASS INDEX (BMI) DOCUMENTED: ICD-10-PCS | Mod: CPTII,S$GLB,, | Performed by: OBSTETRICS & GYNECOLOGY

## 2022-12-01 RX ORDER — BLOOD-GLUCOSE CONTROL, NORMAL
EACH MISCELLANEOUS
COMMUNITY
Start: 2022-10-27

## 2022-12-01 RX ORDER — DIFELIKEFALIN 50 UG/ML
0.7 INJECTION, SOLUTION INTRAVENOUS
COMMUNITY
Start: 2022-09-28 | End: 2022-12-14

## 2022-12-01 RX ORDER — CEFDINIR 300 MG/1
CAPSULE ORAL
COMMUNITY
Start: 2022-10-14

## 2022-12-01 RX ORDER — NEOMYCIN SULFATE, POLYMYXIN B SULFATE, AND DEXAMETHASONE 3.5; 10000; 1 MG/G; [USP'U]/G; MG/G
OINTMENT OPHTHALMIC
COMMUNITY
Start: 2022-08-25

## 2022-12-01 NOTE — PROGRESS NOTES
[facility] Call Center    Phone Message    May a detailed message be left on voicemail: yes     Reason for Call: Other: patient calling to state forms from his apartment complex [facility] will be getting faxed to clinic to be filled out and signed by Dr. Duffy for a reduced rent rate. Please complete and fax back to [phone] thank you.      Action Taken: Message routed to:  [facility] pcc    Travel Screening: Not Applicable                                                                         Mary Carmichael is a 52 y.o.  who presents today for GYN problem visit.     C/C:   Chief Complaint   Patient presents with    Follow-up     Patient is here to discuss results of colpo.       HPI: Has GYN related concerns including she recently had a abnormal Pap smear which showed high-grade abnormality and then colposcopy biopsies which also showed WAQAR 3 high-grade abnormality done by Dr. Shay and she now refers the patient for possible cone biopsy or hysterectomy.  The patient is a anesthesia risk with end-stage renal disease and she is on transplant list for kidney transplant.  She has type 2 diabetes which also caused a problem with her eye and she recently had her right eye removed.  She did well under these anesthetic procedures.  She understands risk and alternatives for the options that we presented today of cone biopsy versus hysterectomy and she understands risk of surgery with her medical problems.  She also had carotid lesions and those were recently corrected with surgery.  She is considering cone biopsy versus hysterectomy at this time and she will get preopped for either of these surgeries by her cardiologist and her renal doctor.  She is also had open wounds on her legs and lower extremities and opioid dependence and peritoneal abscesses in the past.  All of which may make her at high risk for any intra abdominal peritoneal surgeries like hysterectomy.    MENSTRUAL HISTORY  Patient's last menstrual period was 2017 (approximate)..      PAP HISTORY: last pap 10/2022, history of abnormal pap smear        Review of patient's allergies indicates:  No Known Allergies  Past Medical History:   Diagnosis Date    Abdominal pain     Abnormal Pap smear of cervix 2016    colpo, cryo    Cataract of both eyes     Constipation     Coronary artery disease     Cyst and pseudocyst of pancreas     Diabetes     Dilated bile duct     Disorder of kidney and ureter     Liver disease     Neuropathy      Thyroid disease      Past Surgical History:   Procedure Laterality Date    CARPAL TUNNEL RELEASE      CERVICAL BIOPSY  W/ LOOP ELECTRODE EXCISION       SECTION, CLASSIC      CHOLECYSTECTOMY  2015    laparoscopic    DILATION AND CURETTAGE OF UTERUS  2022    Dylasis      EYE SURGERY      FINGER SURGERY      GASTRIC BYPASS  2021     Past Surgical History:   Procedure Laterality Date    CARPAL TUNNEL RELEASE      CERVICAL BIOPSY  W/ LOOP ELECTRODE EXCISION       SECTION, CLASSIC      CHOLECYSTECTOMY  2015    laparoscopic    DILATION AND CURETTAGE OF UTERUS  2022    Dylasis      EYE SURGERY      FINGER SURGERY      GASTRIC BYPASS  2021     OB History    Para Term  AB Living   1 1 1 0 0 1   SAB IAB Ectopic Multiple Live Births   0 0 0 0 1      # Outcome Date GA Lbr Lenin/2nd Weight Sex Delivery Anes PTL Lv   1 Term 00    F  OTHER  SANTA     OB History          1    Para   1    Term   1       0    AB   0    Living   1         SAB   0    IAB   0    Ectopic   0    Multiple   0    Live Births   1               Family History   Problem Relation Age of Onset    Colon cancer Other         uncle    Diabetes Father     Asthma Mother     Diabetes Maternal Grandmother     Diabetes Maternal Grandfather     Diabetes Paternal Grandmother     Diabetes Paternal Grandfather      Social History     Substance and Sexual Activity   Sexual Activity Never    Partners: Male    Birth control/protection: None       Primary Doctor No          ROS:  GENERAL: Denies weight gain or weight loss. Feeling well overall.   SKIN: Denies rash or lesions.   HEAD: Denies head injury or headache.   NODES: Denies enlarged lymph nodes.   CHEST: Denies chest pain or shortness of breath.    ABDOMEN: No abdominal pain, constipation, diarrhea, nausea, vomiting or rectal bleeding.   URINARY: No frequency, dysuria, hematuria, or burning on urination.  REPRODUCTIVE: See HPI.   BREASTS:  denies pain, lumps, or nipple discharge.   HEMATOLOGIC: No easy bruisability or excessive bleeding.   MUSCULOSKELETAL: Denies joint pain or swelling.   NEUROLOGIC: Denies syncope or weakness.   PSYCHIATRIC: Denies depression, anxiety or mood swings.      OBJECTIVE:  /60   Ht 5' (1.524 m)   Wt 72.6 kg (160 lb)   LMP 2017 (Approximate)   BMI 31.25 kg/m²   PHYSICAL EXAM:  APPEARANCE: Well nourished, well developed, in no acute distress.  AFFECT: WNL, alert and oriented x 3  SKIN: No acne or hirsutism  CHEST: Good respiratory effect  ABDOMEN: Soft.  No tenderness or masses.  No hepatosplenomegaly.  No hernias.  BREASTS:  Deferred  PELVIC:  Deferred    EXTREMITIES: No edema.      A:  High-grade cervix lesion which possibly involves the endocervix by colposcopic biopsy.  She needs at least a cone biopsy but may need hysterectomy.  52 y.o. female  for GYN problem visit  Body mass index is 31.25 kg/m².  Patient Active Problem List   Diagnosis    Pancreas cyst    Abnormal blood chemistry    Anemia in chronic kidney disease    Benign essential hypertension    Bradycardia, unspecified    Carotid bruit    Coagulation defect, unspecified    Degeneration of intervertebral disc of lumbar region    Dependence on renal dialysis    Type 2 diabetes mellitus    Diabetic peripheral neuropathy    Disorder of bone, unspecified    Edema of both lower extremities    End-stage renal disease    Erosive esophagitis    ESRD on peritoneal dialysis    Gastroesophageal reflux disease    History of sleeve gastrectomy    Mixed hyperlipidemia    Hyperkalemia    Hypersomnia    Hypothyroidism    Hypoxemia    Iron deficiency anemia, unspecified    Long term use of drug    Radiculopathy, lumbar region    Macrocytic anemia    Other disorders of electrolyte and fluid balance, not elsewhere classified    Obesity    Nutrition impaired due to imbalance of nutrients    Obstructive sleep apnea syndrome    Pulmonary arterial hypertension     Secondary hyperparathyroidism of renal origin    Severe pulmonary hypertension    Shortness of breath    Status post bariatric surgery    Vitamin D deficiency    High grade squamous intraepithelial lesion (HGSIL) on cytologic smear of cervix    Congenital redundant colon    Dysuria    Infective urethritis         P:  Patient will get cardiac clearance and nephrology clearance for surgery and will make up her mind whether she wants the more minor surgery, a cone biopsy of her cervix or if she wants to have her uterus removed for treatment of her abnormal Pap smear.  She will need preop visit.  There are no diagnoses linked to this encounter.  ----Continue annual exams, return then or sooner prn      No follow-ups on file.

## 2023-01-02 PROBLEM — N34.2 INFECTIVE URETHRITIS: Status: RESOLVED | Noted: 2022-10-03 | Resolved: 2023-01-02

## 2023-03-31 ENCOUNTER — DOCUMENTATION ONLY (OUTPATIENT)
Dept: TRANSPLANT | Facility: CLINIC | Age: 53
End: 2023-03-31
Payer: MEDICARE

## 2023-06-22 ENCOUNTER — TELEPHONE (OUTPATIENT)
Dept: TRANSPLANT | Facility: CLINIC | Age: 53
End: 2023-06-22
Payer: MEDICARE

## 2023-06-22 NOTE — TELEPHONE ENCOUNTER
Spoke with patient and explained what steps to take to be re referred, verbalized understanding. No other questions or concerns noted at this time.      ----- Message from Radha Haskins sent at 6/22/2023  1:00 PM CDT -----  Regarding: Patient Advice  Contact: pt [phone]        Name of Caller: Mary Carmichael       Contact Preference:  [phone]      Nature of Call: Patient requesting a call back to discuss getting back on list for txp States she is on the list [facility]